# Patient Record
Sex: FEMALE | Race: WHITE | Employment: OTHER | URBAN - NONMETROPOLITAN AREA
[De-identification: names, ages, dates, MRNs, and addresses within clinical notes are randomized per-mention and may not be internally consistent; named-entity substitution may affect disease eponyms.]

---

## 2017-07-15 ENCOUNTER — HOSPITAL ENCOUNTER (OUTPATIENT)
Age: 67
Discharge: HOME | End: 2017-07-15
Payer: COMMERCIAL

## 2017-07-15 LAB
ALBUMIN SERPL-MCNC: 4.5 G/DL (ref 3.5–5.1)
ALP BLD-CCNC: 86 U/L (ref 38–126)
ALT SERPL-CCNC: 30 U/L (ref 11–66)
ANION GAP SERPL CALCULATED.3IONS-SCNC: 14 MEQ/L (ref 8–16)
AST SERPL-CCNC: 27 U/L (ref 5–40)
BACTERIA: ABNORMAL /HPF
BILIRUB SERPL-MCNC: 0.4 MG/DL (ref 0.3–1.2)
BILIRUBIN DIRECT: < 0.2 MG/DL (ref 0–0.3)
BILIRUBIN URINE: ABNORMAL
BLOOD, URINE: NEGATIVE
BUN BLDV-MCNC: 21 MG/DL (ref 7–22)
CALCIUM SERPL-MCNC: 10.3 MG/DL (ref 8.5–10.5)
CASTS 2: ABNORMAL /LPF
CASTS UA: ABNORMAL /LPF
CHARACTER, URINE: CLEAR
CHLORIDE BLD-SCNC: 104 MEQ/L (ref 98–111)
CHOLESTEROL, TOTAL: 231 MG/DL (ref 100–199)
CO2: 26 MEQ/L (ref 23–33)
COLOR: YELLOW
CREAT SERPL-MCNC: 1.1 MG/DL (ref 0.4–1.2)
CRYSTALS, UA: ABNORMAL
EPITHELIAL CELLS, UA: ABNORMAL /HPF
GFR SERPL CREATININE-BSD FRML MDRD: 49 ML/MIN/1.73M2
GLUCOSE BLD-MCNC: 102 MG/DL (ref 70–108)
GLUCOSE URINE: NEGATIVE MG/DL
HDLC SERPL-MCNC: 48 MG/DL
ICTOTEST: NEGATIVE
KETONES, URINE: NEGATIVE
LDL CHOLESTEROL CALCULATED: 138 MG/DL
LEUKOCYTE ESTERASE, URINE: ABNORMAL
MISCELLANEOUS 2: ABNORMAL
NITRITE, URINE: NEGATIVE
PH UA: 5.5
PHOSPHORUS: 3.8 MG/DL (ref 2.4–4.7)
POTASSIUM SERPL-SCNC: 4.2 MEQ/L (ref 3.5–5.2)
PROTEIN UA: NEGATIVE
RBC URINE: ABNORMAL /HPF
RENAL EPITHELIAL, UA: ABNORMAL
SODIUM BLD-SCNC: 144 MEQ/L (ref 135–145)
SPECIFIC GRAVITY, URINE: 1.02 (ref 1–1.03)
TOTAL PROTEIN: 7.8 G/DL (ref 6.1–8)
TRIGL SERPL-MCNC: 226 MG/DL (ref 0–199)
TSH SERPL DL<=0.05 MIU/L-ACNC: 3.39 UIU/ML (ref 0.4–4.2)
UROBILINOGEN, URINE: 0.2 EU/DL
WBC UA: ABNORMAL /HPF
YEAST: ABNORMAL

## 2017-07-15 PROCEDURE — 82306 VITAMIN D 25 HYDROXY: CPT

## 2017-07-15 PROCEDURE — 84443 ASSAY THYROID STIM HORMONE: CPT

## 2017-07-15 PROCEDURE — 36415 COLL VENOUS BLD VENIPUNCTURE: CPT

## 2017-07-15 PROCEDURE — 87086 URINE CULTURE/COLONY COUNT: CPT

## 2017-07-15 PROCEDURE — 84155 ASSAY OF PROTEIN SERUM: CPT

## 2017-07-15 PROCEDURE — 84450 TRANSFERASE (AST) (SGOT): CPT

## 2017-07-15 PROCEDURE — 84075 ASSAY ALKALINE PHOSPHATASE: CPT

## 2017-07-15 PROCEDURE — 82247 BILIRUBIN TOTAL: CPT

## 2017-07-15 PROCEDURE — 82248 BILIRUBIN DIRECT: CPT

## 2017-07-15 PROCEDURE — 80069 RENAL FUNCTION PANEL: CPT

## 2017-07-15 PROCEDURE — 84460 ALANINE AMINO (ALT) (SGPT): CPT

## 2017-07-15 PROCEDURE — 81001 URINALYSIS AUTO W/SCOPE: CPT

## 2017-07-15 PROCEDURE — 80061 LIPID PANEL: CPT

## 2017-07-15 PROCEDURE — 87077 CULTURE AEROBIC IDENTIFY: CPT

## 2017-07-16 LAB
ORGANISM: ABNORMAL
URINE CULTURE REFLEX: ABNORMAL

## 2017-07-17 LAB — VITAMIN D2 AND D3, TOTAL: NORMAL

## 2017-07-20 ENCOUNTER — HOSPITAL ENCOUNTER (OUTPATIENT)
Age: 67
Discharge: HOME | End: 2017-07-20
Payer: MEDICARE

## 2017-07-20 PROCEDURE — 87338 HPYLORI STOOL AG IA: CPT

## 2017-07-21 LAB — HELICOBACTER PYLORI ANTIGEN, STOOL: NORMAL

## 2017-07-26 ENCOUNTER — HOSPITAL ENCOUNTER (OUTPATIENT)
Dept: ULTRASOUND IMAGING | Age: 67
Discharge: HOME | End: 2017-07-26
Payer: MEDICARE

## 2017-07-26 DIAGNOSIS — N28.9 RENAL INSUFFICIENCY: ICD-10-CM

## 2017-07-26 PROCEDURE — 76775 US EXAM ABDO BACK WALL LIM: CPT

## 2017-09-28 ENCOUNTER — HOSPITAL ENCOUNTER (EMERGENCY)
Age: 67
Discharge: HOME | End: 2017-09-28
Payer: MEDICARE

## 2017-09-28 VITALS
TEMPERATURE: 98.2 F | WEIGHT: 187 LBS | RESPIRATION RATE: 16 BRPM | BODY MASS INDEX: 36.52 KG/M2 | SYSTOLIC BLOOD PRESSURE: 147 MMHG | DIASTOLIC BLOOD PRESSURE: 82 MMHG | HEART RATE: 81 BPM | OXYGEN SATURATION: 97 %

## 2017-09-28 DIAGNOSIS — N30.01 ACUTE CYSTITIS WITH HEMATURIA: Primary | ICD-10-CM

## 2017-09-28 LAB
BILIRUBIN URINE: NEGATIVE
BLOOD, URINE: ABNORMAL
CHARACTER, URINE: CLEAR
COLOR: YELLOW
GLUCOSE, URINE: NEGATIVE MG/DL
KETONES, URINE: NEGATIVE
LEUKOCYTES, UA: ABNORMAL
NITRATE, UA: POSITIVE
PH UA: 6.5 (ref 5–9)
PROTEIN UA: NEGATIVE MG/DL
REFLEX TO URINE C & S: ABNORMAL
SPECIFIC GRAVITY UA: 1.01 (ref 1–1.03)
UROBILINOGEN, URINE: 0.2 EU/DL (ref 0–1)

## 2017-09-28 PROCEDURE — 81003 URINALYSIS AUTO W/O SCOPE: CPT

## 2017-09-28 PROCEDURE — 99213 OFFICE O/P EST LOW 20 MIN: CPT | Performed by: NURSE PRACTITIONER

## 2017-09-28 PROCEDURE — 87086 URINE CULTURE/COLONY COUNT: CPT

## 2017-09-28 PROCEDURE — 99213 OFFICE O/P EST LOW 20 MIN: CPT

## 2017-09-28 RX ORDER — NITROFURANTOIN 25; 75 MG/1; MG/1
100 CAPSULE ORAL 2 TIMES DAILY
Qty: 14 CAPSULE | Refills: 0 | Status: SHIPPED | OUTPATIENT
Start: 2017-09-28 | End: 2017-09-28 | Stop reason: ALTCHOICE

## 2017-09-28 RX ORDER — ATORVASTATIN CALCIUM 20 MG/1
20 TABLET, FILM COATED ORAL DAILY
COMMUNITY

## 2017-09-28 RX ORDER — CIPROFLOXACIN 250 MG/1
500 TABLET, FILM COATED ORAL 2 TIMES DAILY
Qty: 12 TABLET | Refills: 0 | Status: SHIPPED | OUTPATIENT
Start: 2017-09-28 | End: 2017-10-01

## 2017-09-28 ASSESSMENT — PAIN DESCRIPTION - DESCRIPTORS: DESCRIPTORS: BURNING

## 2017-09-28 ASSESSMENT — ENCOUNTER SYMPTOMS
ABDOMINAL PAIN: 0
VOMITING: 0
DIARRHEA: 0
CONSTIPATION: 0
NAUSEA: 0
COLOR CHANGE: 0

## 2017-09-28 ASSESSMENT — PAIN DESCRIPTION - PAIN TYPE: TYPE: ACUTE PAIN

## 2017-09-28 ASSESSMENT — PAIN SCALES - GENERAL: PAINLEVEL_OUTOF10: 2

## 2017-09-29 LAB
ORGANISM: ABNORMAL
URINE CULTURE REFLEX: ABNORMAL

## 2017-12-20 ENCOUNTER — HOSPITAL ENCOUNTER (OUTPATIENT)
Age: 67
Discharge: HOME | End: 2017-12-20
Payer: MEDICARE

## 2017-12-20 LAB
ALBUMIN SERPL-MCNC: 4.3 G/DL (ref 3.5–5.1)
ALT SERPL-CCNC: 17 U/L (ref 11–66)
ANION GAP SERPL CALCULATED.3IONS-SCNC: 15 MEQ/L (ref 8–16)
AVERAGE GLUCOSE: 102 MG/DL (ref 70–126)
BASOPHILS # BLD: 0.6 %
BASOPHILS ABSOLUTE: 0 THOU/MM3 (ref 0–0.1)
BUN BLDV-MCNC: 20 MG/DL (ref 7–22)
CALCIUM SERPL-MCNC: 9.9 MG/DL (ref 8.5–10.5)
CHLORIDE BLD-SCNC: 106 MEQ/L (ref 98–111)
CHOLESTEROL, TOTAL: 135 MG/DL (ref 100–199)
CO2: 25 MEQ/L (ref 23–33)
CREAT SERPL-MCNC: 1 MG/DL (ref 0.4–1.2)
EOSINOPHIL # BLD: 3.6 %
EOSINOPHILS ABSOLUTE: 0.2 THOU/MM3 (ref 0–0.4)
GFR SERPL CREATININE-BSD FRML MDRD: 55 ML/MIN/1.73M2
GLUCOSE BLD-MCNC: 103 MG/DL (ref 70–108)
HBA1C MFR BLD: 5.4 % (ref 4.4–6.4)
HCT VFR BLD CALC: 36.1 % (ref 37–47)
HDLC SERPL-MCNC: 47 MG/DL
HEMOGLOBIN: 12.4 GM/DL (ref 12–16)
LDL CHOLESTEROL CALCULATED: 62 MG/DL
LYMPHOCYTES # BLD: 24.8 %
LYMPHOCYTES ABSOLUTE: 1.4 THOU/MM3 (ref 1–4.8)
MCH RBC QN AUTO: 29.6 PG (ref 27–31)
MCHC RBC AUTO-ENTMCNC: 34.3 GM/DL (ref 33–37)
MCV RBC AUTO: 86.2 FL (ref 81–99)
MONOCYTES # BLD: 8.8 %
MONOCYTES ABSOLUTE: 0.5 THOU/MM3 (ref 0.4–1.3)
NUCLEATED RED BLOOD CELLS: 0 /100 WBC
PDW BLD-RTO: 13 % (ref 11.5–14.5)
PHOSPHORUS: 3.8 MG/DL (ref 2.4–4.7)
PLATELET # BLD: 209 THOU/MM3 (ref 130–400)
PMV BLD AUTO: 9 MCM (ref 7.4–10.4)
POTASSIUM SERPL-SCNC: 4.1 MEQ/L (ref 3.5–5.2)
RBC # BLD: 4.18 MILL/MM3 (ref 4.2–5.4)
SEG NEUTROPHILS: 62.2 %
SEGMENTED NEUTROPHILS ABSOLUTE COUNT: 3.5 THOU/MM3 (ref 1.8–7.7)
SODIUM BLD-SCNC: 146 MEQ/L (ref 135–145)
TRIGL SERPL-MCNC: 131 MG/DL (ref 0–199)
WBC # BLD: 5.7 THOU/MM3 (ref 4.8–10.8)

## 2017-12-20 PROCEDURE — 36415 COLL VENOUS BLD VENIPUNCTURE: CPT

## 2017-12-20 PROCEDURE — 80061 LIPID PANEL: CPT

## 2017-12-20 PROCEDURE — 84460 ALANINE AMINO (ALT) (SGPT): CPT

## 2017-12-20 PROCEDURE — 83036 HEMOGLOBIN GLYCOSYLATED A1C: CPT

## 2017-12-20 PROCEDURE — 80069 RENAL FUNCTION PANEL: CPT

## 2017-12-20 PROCEDURE — 85025 COMPLETE CBC W/AUTO DIFF WBC: CPT

## 2018-01-10 ENCOUNTER — HOSPITAL ENCOUNTER (OUTPATIENT)
Age: 68
Discharge: HOME | End: 2018-01-10
Payer: MEDICARE

## 2018-01-10 ENCOUNTER — HOSPITAL ENCOUNTER (OUTPATIENT)
Dept: GENERAL RADIOLOGY | Age: 68
Discharge: HOME | End: 2018-01-10
Payer: MEDICARE

## 2018-01-10 DIAGNOSIS — M79.641 HAND PAIN, RIGHT: ICD-10-CM

## 2018-01-10 PROCEDURE — 73130 X-RAY EXAM OF HAND: CPT

## 2018-03-12 ENCOUNTER — HOSPITAL ENCOUNTER (OUTPATIENT)
Dept: WOMENS IMAGING | Age: 68
Discharge: HOME | End: 2018-03-12
Payer: MEDICARE

## 2018-03-12 DIAGNOSIS — Z12.31 VISIT FOR SCREENING MAMMOGRAM: ICD-10-CM

## 2018-03-12 PROCEDURE — 77063 BREAST TOMOSYNTHESIS BI: CPT

## 2018-03-14 ENCOUNTER — HOSPITAL ENCOUNTER (OUTPATIENT)
Dept: WOMENS IMAGING | Age: 68
Discharge: HOME | End: 2018-03-14
Payer: MEDICARE

## 2018-03-14 DIAGNOSIS — N63.0 BREAST NODULE: ICD-10-CM

## 2018-03-14 PROCEDURE — 76642 ULTRASOUND BREAST LIMITED: CPT

## 2018-03-14 PROCEDURE — G0279 TOMOSYNTHESIS, MAMMO: HCPCS

## 2018-07-09 ENCOUNTER — HOSPITAL ENCOUNTER (OUTPATIENT)
Age: 68
Discharge: HOME | End: 2018-07-09
Payer: MEDICARE

## 2018-07-09 LAB
ALBUMIN SERPL-MCNC: 4.4 G/DL (ref 3.5–5.1)
ALP BLD-CCNC: 78 U/L (ref 38–126)
ALT SERPL-CCNC: 21 U/L (ref 11–66)
ANION GAP SERPL CALCULATED.3IONS-SCNC: 11 MEQ/L (ref 8–16)
AST SERPL-CCNC: 24 U/L (ref 5–40)
BILIRUB SERPL-MCNC: 0.6 MG/DL (ref 0.3–1.2)
BUN BLDV-MCNC: 22 MG/DL (ref 7–22)
CALCIUM SERPL-MCNC: 9.9 MG/DL (ref 8.5–10.5)
CHLORIDE BLD-SCNC: 107 MEQ/L (ref 98–111)
CHOLESTEROL, TOTAL: 146 MG/DL (ref 100–199)
CO2: 26 MEQ/L (ref 23–33)
CREAT SERPL-MCNC: 1.2 MG/DL (ref 0.4–1.2)
GFR SERPL CREATININE-BSD FRML MDRD: 44 ML/MIN/1.73M2
GLUCOSE BLD-MCNC: 98 MG/DL (ref 70–108)
HDLC SERPL-MCNC: 55 MG/DL
LDL CHOLESTEROL CALCULATED: 76 MG/DL
POTASSIUM SERPL-SCNC: 4.4 MEQ/L (ref 3.5–5.2)
SODIUM BLD-SCNC: 144 MEQ/L (ref 135–145)
TOTAL CK: 84 U/L (ref 30–135)
TOTAL PROTEIN: 7.3 G/DL (ref 6.1–8)
TRIGL SERPL-MCNC: 74 MG/DL (ref 0–199)

## 2018-07-09 PROCEDURE — 82550 ASSAY OF CK (CPK): CPT

## 2018-07-09 PROCEDURE — 36415 COLL VENOUS BLD VENIPUNCTURE: CPT

## 2018-07-09 PROCEDURE — 80053 COMPREHEN METABOLIC PANEL: CPT

## 2018-07-09 PROCEDURE — 80061 LIPID PANEL: CPT

## 2018-09-12 ENCOUNTER — HOSPITAL ENCOUNTER (OUTPATIENT)
Dept: WOMENS IMAGING | Age: 68
Discharge: HOME | End: 2018-09-12
Payer: MEDICARE

## 2018-09-12 ENCOUNTER — APPOINTMENT (OUTPATIENT)
Dept: WOMENS IMAGING | Age: 68
End: 2018-09-12
Payer: MEDICARE

## 2018-09-12 DIAGNOSIS — Z09 FOLLOW UP: ICD-10-CM

## 2018-09-12 DIAGNOSIS — R92.2 BREAST DENSITY: ICD-10-CM

## 2018-09-12 DIAGNOSIS — N63.0 BREAST NODULE: ICD-10-CM

## 2018-09-12 PROCEDURE — 77065 DX MAMMO INCL CAD UNI: CPT

## 2019-01-16 ENCOUNTER — HOSPITAL ENCOUNTER (OUTPATIENT)
Age: 69
Discharge: HOME | End: 2019-01-16
Payer: MEDICARE

## 2019-01-16 LAB
ALBUMIN SERPL-MCNC: 4.5 G/DL (ref 3.5–5.1)
ALP BLD-CCNC: 83 U/L (ref 38–126)
ALT SERPL-CCNC: 21 U/L (ref 11–66)
ANION GAP SERPL CALCULATED.3IONS-SCNC: 14 MEQ/L (ref 8–16)
AST SERPL-CCNC: 25 U/L (ref 5–40)
BASOPHILS # BLD: 0.6 %
BASOPHILS ABSOLUTE: 0 THOU/MM3 (ref 0–0.1)
BILIRUB SERPL-MCNC: 0.7 MG/DL (ref 0.3–1.2)
BUN BLDV-MCNC: 17 MG/DL (ref 7–22)
CALCIUM SERPL-MCNC: 10 MG/DL (ref 8.5–10.5)
CHLORIDE BLD-SCNC: 105 MEQ/L (ref 98–111)
CHOLESTEROL, TOTAL: 143 MG/DL (ref 100–199)
CO2: 25 MEQ/L (ref 23–33)
CREAT SERPL-MCNC: 1.1 MG/DL (ref 0.4–1.2)
EOSINOPHIL # BLD: 2.1 %
EOSINOPHILS ABSOLUTE: 0.1 THOU/MM3 (ref 0–0.4)
ERYTHROCYTE [DISTWIDTH] IN BLOOD BY AUTOMATED COUNT: 12.8 % (ref 11.5–14.5)
ERYTHROCYTE [DISTWIDTH] IN BLOOD BY AUTOMATED COUNT: 41.4 FL (ref 35–45)
GFR SERPL CREATININE-BSD FRML MDRD: 49 ML/MIN/1.73M2
GLUCOSE BLD-MCNC: 105 MG/DL (ref 70–108)
HCT VFR BLD CALC: 40.3 % (ref 37–47)
HDLC SERPL-MCNC: 50 MG/DL
HEMOGLOBIN: 13.2 GM/DL (ref 12–16)
IMMATURE GRANS (ABS): 0.01 THOU/MM3 (ref 0–0.07)
IMMATURE GRANULOCYTES: 0.2 %
LDL CHOLESTEROL CALCULATED: 60 MG/DL
LYMPHOCYTES # BLD: 22.5 %
LYMPHOCYTES ABSOLUTE: 1.4 THOU/MM3 (ref 1–4.8)
MCH RBC QN AUTO: 29.1 PG (ref 26–33)
MCHC RBC AUTO-ENTMCNC: 32.8 GM/DL (ref 32.2–35.5)
MCV RBC AUTO: 89 FL (ref 81–99)
MONOCYTES # BLD: 10 %
MONOCYTES ABSOLUTE: 0.6 THOU/MM3 (ref 0.4–1.3)
NUCLEATED RED BLOOD CELLS: 0 /100 WBC
PHOSPHORUS: 3.4 MG/DL (ref 2.4–4.7)
PLATELET # BLD: 242 THOU/MM3 (ref 130–400)
PMV BLD AUTO: 10.6 FL (ref 9.4–12.4)
POTASSIUM SERPL-SCNC: 4 MEQ/L (ref 3.5–5.2)
RBC # BLD: 4.53 MILL/MM3 (ref 4.2–5.4)
SEG NEUTROPHILS: 64.6 %
SEGMENTED NEUTROPHILS ABSOLUTE COUNT: 4 THOU/MM3 (ref 1.8–7.7)
SODIUM BLD-SCNC: 144 MEQ/L (ref 135–145)
TOTAL CK: 88 U/L (ref 30–135)
TOTAL PROTEIN: 7.6 G/DL (ref 6.1–8)
TRIGL SERPL-MCNC: 163 MG/DL (ref 0–199)
WBC # BLD: 6.2 THOU/MM3 (ref 4.8–10.8)

## 2019-01-16 PROCEDURE — 80061 LIPID PANEL: CPT

## 2019-01-16 PROCEDURE — 36415 COLL VENOUS BLD VENIPUNCTURE: CPT

## 2019-01-16 PROCEDURE — 85025 COMPLETE CBC W/AUTO DIFF WBC: CPT

## 2019-01-16 PROCEDURE — 80053 COMPREHEN METABOLIC PANEL: CPT

## 2019-01-16 PROCEDURE — 84100 ASSAY OF PHOSPHORUS: CPT

## 2019-01-16 PROCEDURE — 82550 ASSAY OF CK (CPK): CPT

## 2019-03-29 ENCOUNTER — HOSPITAL ENCOUNTER (EMERGENCY)
Age: 69
Discharge: HOME | End: 2019-03-29
Payer: MEDICARE

## 2019-03-29 VITALS
RESPIRATION RATE: 16 BRPM | OXYGEN SATURATION: 97 % | SYSTOLIC BLOOD PRESSURE: 125 MMHG | HEART RATE: 78 BPM | DIASTOLIC BLOOD PRESSURE: 75 MMHG | WEIGHT: 184 LBS | BODY MASS INDEX: 35.94 KG/M2 | TEMPERATURE: 98.4 F

## 2019-03-29 DIAGNOSIS — S76.012A MUSCLE STRAIN OF GLUTEAL REGION, LEFT, INITIAL ENCOUNTER: Primary | ICD-10-CM

## 2019-03-29 DIAGNOSIS — M70.61 GREATER TROCHANTERIC BURSITIS OF RIGHT HIP: ICD-10-CM

## 2019-03-29 LAB
BILIRUBIN URINE: NEGATIVE
BLOOD, URINE: NEGATIVE
CHARACTER, URINE: CLEAR
COLOR: ABNORMAL
GLUCOSE, URINE: NEGATIVE MG/DL
KETONES, URINE: NEGATIVE
LEUKOCYTES, UA: NEGATIVE
NITRATE, UA: NEGATIVE
PH UA: 7 (ref 5–9)
PROTEIN UA: NEGATIVE MG/DL
REFLEX TO URINE C & S: ABNORMAL
SPECIFIC GRAVITY UA: 1.01 (ref 1–1.03)
UROBILINOGEN, URINE: 0.2 EU/DL (ref 0–1)

## 2019-03-29 PROCEDURE — 81003 URINALYSIS AUTO W/O SCOPE: CPT

## 2019-03-29 PROCEDURE — 99213 OFFICE O/P EST LOW 20 MIN: CPT

## 2019-03-29 PROCEDURE — 99213 OFFICE O/P EST LOW 20 MIN: CPT | Performed by: NURSE PRACTITIONER

## 2019-03-29 RX ORDER — RANITIDINE 300 MG/1
300 CAPSULE ORAL EVERY EVENING
COMMUNITY
End: 2020-01-02

## 2019-03-29 RX ORDER — ALENDRONATE SODIUM 70 MG/1
70 TABLET ORAL
COMMUNITY

## 2019-03-29 RX ORDER — METHYLPREDNISOLONE 4 MG/1
TABLET ORAL
Qty: 1 KIT | Refills: 0 | Status: SHIPPED | OUTPATIENT
Start: 2019-03-29 | End: 2019-04-04

## 2019-03-29 ASSESSMENT — PAIN DESCRIPTION - ORIENTATION: ORIENTATION: RIGHT

## 2019-03-29 ASSESSMENT — PAIN SCALES - GENERAL: PAINLEVEL_OUTOF10: 5

## 2019-03-29 ASSESSMENT — PAIN DESCRIPTION - PAIN TYPE: TYPE: ACUTE PAIN

## 2019-03-29 ASSESSMENT — PAIN - FUNCTIONAL ASSESSMENT: PAIN_FUNCTIONAL_ASSESSMENT: PREVENTS OR INTERFERES SOME ACTIVE ACTIVITIES AND ADLS

## 2019-03-29 ASSESSMENT — ENCOUNTER SYMPTOMS
ABDOMINAL PAIN: 1
DIARRHEA: 0
COUGH: 0
BACK PAIN: 0
VOMITING: 0
CONSTIPATION: 0
NAUSEA: 0

## 2019-03-29 ASSESSMENT — PAIN DESCRIPTION - FREQUENCY: FREQUENCY: CONTINUOUS

## 2019-03-29 ASSESSMENT — PAIN DESCRIPTION - DESCRIPTORS: DESCRIPTORS: THROBBING

## 2019-03-29 ASSESSMENT — PAIN DESCRIPTION - LOCATION: LOCATION: HIP

## 2019-03-29 NOTE — ED TRIAGE NOTES
Pt walked to room 8. Pt here with complaints of right hip pain and mid lower abd pain. Pt thinks maybe bladder. Pt states has no uti symptoms. Hip started 3 weeks ago. Pt has used ice, heat and pain patches, but not helping. Hurts more when standing up. Low abd pain started wed.

## 2019-03-29 NOTE — ED PROVIDER NOTES
[facility]  Urgent Care Encounter       CHIEF COMPLAINT       Chief Complaint   Patient presents with    Hip Pain     Pt having right hip pain x's 3 weeks. Aslo low mid abd pain started Wed. Nurses Notes reviewed and I agree except as noted in the HPI. HISTORY OF PRESENT ILLNESS   Tracey Gutierrez is a 77 y.o. female who presents to the urgent care center complaining of pain to the right lower back and hip that has been persistent over the past 3 weeks. The patient also states that she's had some intermittent lower abdominal pain over the past couple of days. The patient states that she does a and aerobic class III times a week area and she states they do walking in place working with bands, and a ball and she has been doing that routinely for the past several years. The patient states that she has had pain to the right upper posterior hip as well as on the right side of her right hip. Patient denies any injuries or falls. The patient states that it feels better when she is sitting. She has been putting ice and heat to the area and Tylenol for pain. She states that she cannot take NSAIDs. Patient denied any urinary symptoms, nausea, vomiting fever or chills related to the abdominal pain. The patient states that she did increase her fruits and vegetables and had 2 large bowel movements this morning. The history is provided by the patient. No  was used. Hip Pain   This is a new problem. Episode onset: 3 weeks. The problem occurs constantly. The problem has not changed since onset. Associated symptoms include abdominal pain. The symptoms are aggravated by walking, twisting and exertion. Nothing relieves the symptoms. She has tried rest, a cold compress and a warm compress for the symptoms. The treatment provided no relief.        REVIEW OF SYSTEMS     Review of Systems   Constitutional: Negative for activity change, appetite change, chills, diaphoresis, mg by mouth daily      vitamin D (CHOLECALCIFEROL) 1000 UNIT TABS tablet Take 1,000 Units by mouth daily             ALLERGIES     Patient is is allergic to other; penicillins; and sulfa antibiotics. Patients   There is no immunization history on file for this patient. FAMILY HISTORY     Patient's family history is not on file. SOCIAL HISTORY     Patient  reports that she has never smoked. She has never used smokeless tobacco. She reports that she does not drink alcohol or use drugs. PHYSICAL EXAM     ED TRIAGE VITALS  BP: 125/75, Temp: 98.4 °F (36.9 °C), Pulse: 78, Resp: 16, SpO2: 97 %,Estimated body mass index is 35.94 kg/m² as calculated from the following:    Height as of 4/28/17: 5' (1.524 m). Weight as of this encounter: 184 lb (83.5 kg). ,No LMP recorded. Patient is postmenopausal.    Physical Exam   Constitutional: She is oriented to person, place, and time. Vital signs are normal. She appears well-developed and well-nourished. She is cooperative. Non-toxic appearance. She does not have a sickly appearance. She does not appear ill. No distress. HENT:   Head: Normocephalic. Right Ear: External ear normal.   Left Ear: External ear normal.   Nose: Nose normal.   Neck: Normal range of motion and full passive range of motion without pain. Neck supple. Cardiovascular: Normal rate. Pulmonary/Chest: Effort normal.   Musculoskeletal: She exhibits tenderness. She exhibits no edema. Right hip: She exhibits tenderness and bony tenderness. She exhibits normal range of motion, normal strength, no swelling and no crepitus. Legs:  Neurological: She is alert and oriented to person, place, and time. Skin: Skin is warm and dry. Capillary refill takes less than 2 seconds. She is not diaphoretic. Patient denies recent trauma. The patient denies urinary incontinence, urinary retention, and numbness in the extremities. No spinal tenderness or deformity noted.  Bilateral lumbar muscles

## 2019-07-13 ENCOUNTER — HOSPITAL ENCOUNTER (OUTPATIENT)
Age: 69
Discharge: HOME | End: 2019-07-13
Payer: MEDICARE

## 2019-07-13 LAB
ALT SERPL-CCNC: 18 U/L (ref 11–66)
ANION GAP SERPL CALCULATED.3IONS-SCNC: 12 MEQ/L (ref 8–16)
AST SERPL-CCNC: 22 U/L (ref 5–40)
BUN BLDV-MCNC: 19 MG/DL (ref 7–22)
CALCIUM SERPL-MCNC: 10 MG/DL (ref 8.5–10.5)
CHLORIDE BLD-SCNC: 105 MEQ/L (ref 98–111)
CHOLESTEROL, TOTAL: 142 MG/DL (ref 100–199)
CO2: 27 MEQ/L (ref 23–33)
CREAT SERPL-MCNC: 1.1 MG/DL (ref 0.4–1.2)
GFR SERPL CREATININE-BSD FRML MDRD: 49 ML/MIN/1.73M2
GLUCOSE BLD-MCNC: 99 MG/DL (ref 70–108)
HDLC SERPL-MCNC: 56 MG/DL
LDL CHOLESTEROL CALCULATED: 44 MG/DL
POTASSIUM SERPL-SCNC: 4 MEQ/L (ref 3.5–5.2)
SODIUM BLD-SCNC: 144 MEQ/L (ref 135–145)
TRIGL SERPL-MCNC: 211 MG/DL (ref 0–199)

## 2019-07-13 PROCEDURE — 80048 BASIC METABOLIC PNL TOTAL CA: CPT

## 2019-07-13 PROCEDURE — 84460 ALANINE AMINO (ALT) (SGPT): CPT

## 2019-07-13 PROCEDURE — 36415 COLL VENOUS BLD VENIPUNCTURE: CPT

## 2019-07-13 PROCEDURE — 84450 TRANSFERASE (AST) (SGOT): CPT

## 2019-07-13 PROCEDURE — 80061 LIPID PANEL: CPT

## 2019-07-27 ENCOUNTER — HOSPITAL ENCOUNTER (OUTPATIENT)
Age: 69
Discharge: HOME | End: 2019-07-27
Payer: MEDICARE

## 2019-07-27 LAB
ANION GAP SERPL CALCULATED.3IONS-SCNC: 12 MEQ/L (ref 8–16)
BILIRUBIN URINE: NEGATIVE
BLOOD, URINE: NEGATIVE
BUN BLDV-MCNC: 21 MG/DL (ref 7–22)
CALCIUM SERPL-MCNC: 9.8 MG/DL (ref 8.5–10.5)
CHARACTER, URINE: CLEAR
CHLORIDE BLD-SCNC: 103 MEQ/L (ref 98–111)
CO2: 27 MEQ/L (ref 23–33)
COLOR: YELLOW
CREAT SERPL-MCNC: 1 MG/DL (ref 0.4–1.2)
GFR SERPL CREATININE-BSD FRML MDRD: 55 ML/MIN/1.73M2
GLUCOSE BLD-MCNC: 96 MG/DL (ref 70–108)
GLUCOSE URINE: NEGATIVE MG/DL
KETONES, URINE: NEGATIVE
LEUKOCYTE ESTERASE, URINE: NEGATIVE
NITRITE, URINE: NEGATIVE
PH UA: 7 (ref 5–9)
POTASSIUM SERPL-SCNC: 4.1 MEQ/L (ref 3.5–5.2)
PROTEIN UA: NEGATIVE
SODIUM BLD-SCNC: 142 MEQ/L (ref 135–145)
SPECIFIC GRAVITY, URINE: 1.01 (ref 1–1.03)
URIC ACID: 7.1 MG/DL (ref 2.4–5.7)
UROBILINOGEN, URINE: 0.2 EU/DL (ref 0–1)

## 2019-07-27 PROCEDURE — 84165 PROTEIN E-PHORESIS SERUM: CPT

## 2019-07-27 PROCEDURE — 84550 ASSAY OF BLOOD/URIC ACID: CPT

## 2019-07-27 PROCEDURE — 82784 ASSAY IGA/IGD/IGG/IGM EACH: CPT

## 2019-07-27 PROCEDURE — 80048 BASIC METABOLIC PNL TOTAL CA: CPT

## 2019-07-27 PROCEDURE — 86334 IMMUNOFIX E-PHORESIS SERUM: CPT

## 2019-07-27 PROCEDURE — 83883 ASSAY NEPHELOMETRY NOT SPEC: CPT

## 2019-07-27 PROCEDURE — 36415 COLL VENOUS BLD VENIPUNCTURE: CPT

## 2019-07-27 PROCEDURE — 86335 IMMUNFIX E-PHORSIS/URINE/CSF: CPT

## 2019-07-27 PROCEDURE — 84160 ASSAY OF PROTEIN ANY SOURCE: CPT

## 2019-07-27 PROCEDURE — 84156 ASSAY OF PROTEIN URINE: CPT

## 2019-07-27 PROCEDURE — 81003 URINALYSIS AUTO W/O SCOPE: CPT

## 2019-07-29 LAB — IMMUNOFIXATION WITH QUANT: NORMAL

## 2019-07-31 LAB — PROTEIN ELECTROPHORESIS, URINE: NORMAL

## 2019-09-12 ENCOUNTER — HOSPITAL ENCOUNTER (OUTPATIENT)
Dept: WOMENS IMAGING | Age: 69
Discharge: HOME | End: 2019-09-12
Payer: MEDICARE

## 2019-09-12 DIAGNOSIS — M81.0 AGE-RELATED OSTEOPOROSIS WITHOUT CURRENT PATHOLOGICAL FRACTURE: ICD-10-CM

## 2019-09-12 PROCEDURE — 77080 DXA BONE DENSITY AXIAL: CPT

## 2019-09-16 ENCOUNTER — HOSPITAL ENCOUNTER (OUTPATIENT)
Dept: WOMENS IMAGING | Age: 69
Discharge: HOME | End: 2019-09-16
Payer: MEDICARE

## 2019-09-16 DIAGNOSIS — Z12.39 BREAST SCREENING: ICD-10-CM

## 2019-09-16 PROCEDURE — 77063 BREAST TOMOSYNTHESIS BI: CPT

## 2020-01-02 ENCOUNTER — HOSPITAL ENCOUNTER (EMERGENCY)
Age: 70
Discharge: HOME | End: 2020-01-02
Payer: MEDICARE

## 2020-01-02 VITALS
DIASTOLIC BLOOD PRESSURE: 80 MMHG | OXYGEN SATURATION: 97 % | TEMPERATURE: 98.6 F | WEIGHT: 181 LBS | RESPIRATION RATE: 16 BRPM | HEART RATE: 72 BPM | SYSTOLIC BLOOD PRESSURE: 159 MMHG | HEIGHT: 60 IN | BODY MASS INDEX: 35.53 KG/M2

## 2020-01-02 PROCEDURE — 99213 OFFICE O/P EST LOW 20 MIN: CPT | Performed by: NURSE PRACTITIONER

## 2020-01-02 PROCEDURE — 99213 OFFICE O/P EST LOW 20 MIN: CPT

## 2020-01-02 RX ORDER — PREDNISONE 20 MG/1
40 TABLET ORAL DAILY
Qty: 14 TABLET | Refills: 0 | Status: SHIPPED | OUTPATIENT
Start: 2020-01-02 | End: 2020-01-09

## 2020-01-02 ASSESSMENT — ENCOUNTER SYMPTOMS
SHORTNESS OF BREATH: 0
NAUSEA: 0
SORE THROAT: 1
VOMITING: 0
COUGH: 1

## 2020-01-02 NOTE — ED TRIAGE NOTES
Pt to [facility] ambulatory with URI s/s, cough, and headache. This started 2 days ago.
Never Smoker    Smokeless tobacco: Never Used    Alcohol use 0.0 oz/week      Comment: q 2-3 weeks    Drug use: No    Sexual activity: Yes     Partners: Male     Birth control/ protection: Injection      Comment: depo     Other Topics Concern    Not on file     Social History Narrative    No narrative on file       TOBACCO:   reports that she has never smoked. She has never used smokeless tobacco.  ETOH:   reports that she drinks alcohol. Family History:   Family History   Problem Relation Age of Onset    Other Mother         dvt/factor v leiden    High Blood Pressure Mother    [facility] ADHD Mother     ADHD Brother     Cancer Paternal Grandmother         pancreatic    Other Maternal Grandmother         factor v leiden    Diabetes Maternal Grandfather     Prostate Cancer Maternal Grandfather     Other Other         brain aneurysm    Breast Cancer Maternal Cousin 27    Colon Cancer Neg Hx          Plan:  Pt interventions:  Provided education, Discussed self-care (sleep, nutrition, rewarding activities, social support, exercise), Supportive techniques, Emphasized self-care as important for managing overall health and Cognitive strategies to target chronic life issues including coping, past family and current family issues      Pt Behavioral Change Plan:  Patient returning for supportive counseling to address:  -depression, coping with health issues. There are no Patient Instructions on file for this visit.

## 2020-01-02 NOTE — ED PROVIDER NOTES
[facility]       CHIEF COMPLAINT       Chief Complaint   Patient presents with    Cough    URI    Headache       Nurses Notes reviewed and I agree except as noted in the HPI. HISTORY OF PRESENT ILLNESS   Theresa Henson is a 68 y.o. female who presents for evaluation of intermittent dizziness that occurs mainly with changing positions. Patient denies any chest pain or shortness of breath but states that over the past 3 to 4 weeks she has had consistent cough with intermittent sore throat as well. States that she has been using multiple over-the-counter medications without any relief. States that the dizziness began roughly 2 days ago and she notes it more anytime she turns her head. She denies numbness, tingling, headaches or visual disturbances. The history is provided by the patient. REVIEW OF SYSTEMS     Review of Systems   Constitutional: Negative for chills and fever. HENT: Positive for congestion and sore throat. Respiratory: Positive for cough. Negative for shortness of breath. Cardiovascular: Negative for chest pain. Gastrointestinal: Negative for nausea and vomiting. Musculoskeletal: Negative for arthralgias and myalgias. Skin: Negative for rash. Allergic/Immunologic: Negative for environmental allergies. Neurological: Positive for dizziness. Negative for headaches. PAST MEDICAL HISTORY         Diagnosis Date    Cancer [facility])     skin    GERD (gastroesophageal reflux disease)     Hyperlipidemia     Hypertension        SURGICALHISTORY     Patient  has a past surgical history that includes Cholecystectomy (06/11/2011); Colonoscopy (07/12/2012); skin biopsy; Dilation and curettage of uterus (2010 - 2013); and Mohs surgery (Right, 04/28/2017).     CURRENT MEDICATIONS       Previous Medications    ALENDRONATE (FOSAMAX) 70 MG TABLET    Take 70 mg by mouth every 7 days    ATORVASTATIN (LIPITOR) 20 MG TABLET    Take 20 mg by mouth daily    CALCIUM CARBONATE (OSCAL) 500 MG TABS TABLET    Take 500 mg by mouth daily    CLOBETASOL (TEMOVATE) 0.05 % CREAM    Apply topically 2 times daily Apply topically 2 times daily. hands    LOSARTAN-HYDROCHLOROTHIAZIDE (HYZAAR) 100-12.5 MG PER TABLET    Take 1 tablet by mouth daily    MULTIPLE VITAMINS-MINERALS (THERAPEUTIC MULTIVITAMIN-MINERALS) TABLET    Take 1 tablet by mouth daily    VITAMIN D (CHOLECALCIFEROL) 1000 UNIT TABS TABLET    Take 1,000 Units by mouth daily       ALLERGIES     Patient is is allergic to other; penicillins; and sulfa antibiotics. Patients   There is no immunization history on file for this patient. FAMILY HISTORY     Patient's family history is not on file. SOCIAL HISTORY     Patient  reports that she has never smoked. She has never used smokeless tobacco. She reports that she does not drink alcohol or use drugs. PHYSICAL EXAM     ED TRIAGE VITALS  BP: (!) 159/80, Temp: 98.6 °F (37 °C), Pulse: 72, Resp: 16, SpO2: 97 %,Estimated body mass index is 35.35 kg/m² as calculated from the following:    Height as of this encounter: 5' (1.524 m). Weight as of this encounter: 181 lb (82.1 kg). ,No LMP recorded. Patient is postmenopausal.    Physical Exam  Vitals signs and nursing note reviewed. Constitutional:       General: She is not in acute distress. Appearance: She is well-developed. She is not diaphoretic. HENT:      Right Ear: Tympanic membrane normal.      Left Ear: A middle ear effusion is present. Eyes:      Conjunctiva/sclera:      Right eye: Right conjunctiva is not injected. Left eye: Left conjunctiva is not injected. Pupils: Pupils are equal.   Neck:      Musculoskeletal: Normal range of motion. Cardiovascular:      Rate and Rhythm: Normal rate and regular rhythm. Heart sounds: No murmur. Pulmonary:      Effort: Pulmonary effort is normal. No respiratory distress. Breath sounds: Normal breath sounds.    Musculoskeletal: Right knee: She exhibits normal range of motion. Left knee: She exhibits normal range of motion. Skin:     General: Skin is warm. Findings: No rash. Neurological:      Mental Status: She is alert and oriented to person, place, and time. Psychiatric:         Behavior: Behavior normal.         DIAGNOSTIC RESULTS     Labs:No results found for this visit on 01/02/20. IMAGING:    No orders to display         EKG:  none    URGENT CARE COURSE:     Vitals:    01/02/20 1009   BP: (!) 159/80   Pulse: 72   Resp: 16   Temp: 98.6 °F (37 °C)   TempSrc: Temporal   SpO2: 97%   Weight: 181 lb (82.1 kg)   Height: 5' (1.524 m)       Medications - No data to display         PROCEDURES:  None    FINAL IMPRESSION      1. Upper respiratory tract infection, unspecified type    2. Benign paroxysmal positional vertigo, unspecified laterality          DISPOSITION/ PLAN     Exam is consistent with a viral upper respiratory infection, likely causing middle ear effusion and leading to the vertigo. Discussed with the patient the plan to treat with Coricidin with Mucinex as well as prednisone and she is advised to follow-up with her PCP if the symptoms do not improve or present to the ER if they significantly worsen. She is agreeable to plan as discussed. PATIENT REFERRED TO:  Jola Mcardle [address] [facility] [address] / [facility] [id number]      DISCHARGE MEDICATIONS:  New Prescriptions    DEXTROMETHORPHAN-GUAIFENESIN (CORICIDIN HBP CONGESTION/COUGH)  MG CAPS    Take 1 capsule by mouth 2 times daily as needed (cough/congestion)    PREDNISONE (DELTASONE) 20 MG TABLET    Take 2 tablets by mouth daily for 7 days       Discontinued Medications    MEGESTROL (MEGACE) 40 MG TABLET    Take 40 mg by mouth daily Takes on days 1-12. Take every other month.     PHENAZOPYRIDINE (PYRIDIUM) 200 MG TABLET    Take 1 tablet by mouth 3 times daily as needed for Pain    PHYTONADIONE (VITAMIN K1 IJ)    Inject as directed daily

## 2020-01-22 ENCOUNTER — HOSPITAL ENCOUNTER (OUTPATIENT)
Age: 70
Discharge: HOME | End: 2020-01-22
Payer: MEDICARE

## 2020-01-22 LAB
ALBUMIN SERPL-MCNC: 4.5 G/DL (ref 3.5–5.1)
ALP BLD-CCNC: 69 U/L (ref 38–126)
ALT SERPL-CCNC: 17 U/L (ref 11–66)
ANION GAP SERPL CALCULATED.3IONS-SCNC: 12 MEQ/L (ref 8–16)
AST SERPL-CCNC: 21 U/L (ref 5–40)
BILIRUB SERPL-MCNC: 0.6 MG/DL (ref 0.3–1.2)
BUN BLDV-MCNC: 18 MG/DL (ref 7–22)
CALCIUM SERPL-MCNC: 10 MG/DL (ref 8.5–10.5)
CHLORIDE BLD-SCNC: 104 MEQ/L (ref 98–111)
CHOLESTEROL, TOTAL: 169 MG/DL (ref 100–199)
CO2: 26 MEQ/L (ref 23–33)
CREAT SERPL-MCNC: 1 MG/DL (ref 0.4–1.2)
GFR SERPL CREATININE-BSD FRML MDRD: 55 ML/MIN/1.73M2
GLUCOSE BLD-MCNC: 103 MG/DL (ref 70–108)
HDLC SERPL-MCNC: 56 MG/DL
LDL CHOLESTEROL CALCULATED: 79 MG/DL
POTASSIUM SERPL-SCNC: 4 MEQ/L (ref 3.5–5.2)
SODIUM BLD-SCNC: 142 MEQ/L (ref 135–145)
TOTAL PROTEIN: 7.3 G/DL (ref 6.1–8)
TRIGL SERPL-MCNC: 168 MG/DL (ref 0–199)

## 2020-01-22 PROCEDURE — 80053 COMPREHEN METABOLIC PANEL: CPT

## 2020-01-22 PROCEDURE — 80061 LIPID PANEL: CPT

## 2020-01-22 PROCEDURE — 82306 VITAMIN D 25 HYDROXY: CPT

## 2020-01-22 PROCEDURE — 36415 COLL VENOUS BLD VENIPUNCTURE: CPT

## 2020-01-25 LAB — VITAMIN D2 AND D3, TOTAL: NORMAL

## 2020-02-03 ENCOUNTER — HOSPITAL ENCOUNTER (OUTPATIENT)
Age: 70
Discharge: HOME | End: 2020-02-03
Payer: MEDICARE

## 2020-02-03 LAB
ALBUMIN SERPL-MCNC: 4.4 G/DL (ref 3.5–5.1)
ALP BLD-CCNC: 76 U/L (ref 38–126)
ALT SERPL-CCNC: 15 U/L (ref 11–66)
ANION GAP SERPL CALCULATED.3IONS-SCNC: 13 MEQ/L (ref 8–16)
AST SERPL-CCNC: 20 U/L (ref 5–40)
BILIRUB SERPL-MCNC: 0.4 MG/DL (ref 0.3–1.2)
BILIRUBIN URINE: NEGATIVE
BLOOD, URINE: NEGATIVE
BUN BLDV-MCNC: 22 MG/DL (ref 7–22)
CALCIUM SERPL-MCNC: 10.1 MG/DL (ref 8.5–10.5)
CHARACTER, URINE: CLEAR
CHLORIDE BLD-SCNC: 102 MEQ/L (ref 98–111)
CO2: 27 MEQ/L (ref 23–33)
COLOR: YELLOW
CREAT SERPL-MCNC: 1 MG/DL (ref 0.4–1.2)
ERYTHROCYTE [DISTWIDTH] IN BLOOD BY AUTOMATED COUNT: 12.7 % (ref 11.5–14.5)
ERYTHROCYTE [DISTWIDTH] IN BLOOD BY AUTOMATED COUNT: 41.4 FL (ref 35–45)
GFR SERPL CREATININE-BSD FRML MDRD: 55 ML/MIN/1.73M2
GLUCOSE BLD-MCNC: 87 MG/DL (ref 70–108)
GLUCOSE, URINE: NEGATIVE MG/DL
HCT VFR BLD CALC: 38.1 % (ref 37–47)
HEMOGLOBIN: 12.4 GM/DL (ref 12–16)
KETONES, URINE: NEGATIVE
LEUKOCYTE EST, POC: NEGATIVE
MCH RBC QN AUTO: 29.3 PG (ref 26–33)
MCHC RBC AUTO-ENTMCNC: 32.5 GM/DL (ref 32.2–35.5)
MCV RBC AUTO: 90.1 FL (ref 81–99)
NITRITE, URINE: NEGATIVE
PH UA: 6.5 (ref 5–9)
PLATELET # BLD: 237 THOU/MM3 (ref 130–400)
PMV BLD AUTO: 10.5 FL (ref 9.4–12.4)
POTASSIUM SERPL-SCNC: 4.3 MEQ/L (ref 3.5–5.2)
PROTEIN UA: NEGATIVE MG/DL
RBC # BLD: 4.23 MILL/MM3 (ref 4.2–5.4)
SODIUM BLD-SCNC: 142 MEQ/L (ref 135–145)
SPECIFIC GRAVITY UA: 1.01 (ref 1–1.03)
TOTAL PROTEIN: 7.4 G/DL (ref 6.1–8)
URIC ACID: 6.7 MG/DL (ref 2.4–5.7)
UROBILINOGEN, URINE: 0.2 EU/DL (ref 0–1)
WBC # BLD: 6 THOU/MM3 (ref 4.8–10.8)

## 2020-02-03 PROCEDURE — 36415 COLL VENOUS BLD VENIPUNCTURE: CPT

## 2020-02-03 PROCEDURE — 80053 COMPREHEN METABOLIC PANEL: CPT

## 2020-02-03 PROCEDURE — 85027 COMPLETE CBC AUTOMATED: CPT

## 2020-02-03 PROCEDURE — 84550 ASSAY OF BLOOD/URIC ACID: CPT

## 2020-02-03 PROCEDURE — 81003 URINALYSIS AUTO W/O SCOPE: CPT

## 2020-07-23 ENCOUNTER — HOSPITAL ENCOUNTER (OUTPATIENT)
Age: 70
Discharge: HOME | End: 2020-07-23
Payer: MEDICARE

## 2020-07-23 LAB
ALBUMIN SERPL-MCNC: 4.3 G/DL (ref 3.5–5.1)
ALP BLD-CCNC: 72 U/L (ref 38–126)
ALT SERPL-CCNC: 15 U/L (ref 11–66)
ANION GAP SERPL CALCULATED.3IONS-SCNC: 11 MEQ/L (ref 8–16)
AST SERPL-CCNC: 20 U/L (ref 5–40)
BILIRUB SERPL-MCNC: 0.6 MG/DL (ref 0.3–1.2)
BUN BLDV-MCNC: 20 MG/DL (ref 7–22)
CALCIUM SERPL-MCNC: 9.8 MG/DL (ref 8.5–10.5)
CHLORIDE BLD-SCNC: 106 MEQ/L (ref 98–111)
CHOLESTEROL, TOTAL: 147 MG/DL (ref 100–199)
CO2: 26 MEQ/L (ref 23–33)
CREAT SERPL-MCNC: 1 MG/DL (ref 0.4–1.2)
GFR SERPL CREATININE-BSD FRML MDRD: 55 ML/MIN/1.73M2
GLUCOSE BLD-MCNC: 90 MG/DL (ref 70–108)
HDLC SERPL-MCNC: 53 MG/DL
LDL CHOLESTEROL CALCULATED: 58 MG/DL
POTASSIUM SERPL-SCNC: 4.3 MEQ/L (ref 3.5–5.2)
SODIUM BLD-SCNC: 143 MEQ/L (ref 135–145)
TOTAL PROTEIN: 7 G/DL (ref 6.1–8)
TRIGL SERPL-MCNC: 178 MG/DL (ref 0–199)

## 2020-07-23 PROCEDURE — 36415 COLL VENOUS BLD VENIPUNCTURE: CPT

## 2020-07-23 PROCEDURE — 80053 COMPREHEN METABOLIC PANEL: CPT

## 2020-07-23 PROCEDURE — 80061 LIPID PANEL: CPT

## 2020-07-23 PROCEDURE — 82306 VITAMIN D 25 HYDROXY: CPT

## 2020-07-29 LAB — VITAMIN D2 AND D3, TOTAL: NORMAL

## 2020-08-02 ENCOUNTER — HOSPITAL ENCOUNTER (EMERGENCY)
Age: 70
Discharge: HOME | End: 2020-08-02
Attending: FAMILY MEDICINE
Payer: MEDICARE

## 2020-08-02 ENCOUNTER — APPOINTMENT (OUTPATIENT)
Dept: GENERAL RADIOLOGY | Age: 70
End: 2020-08-02
Payer: MEDICARE

## 2020-08-02 VITALS
SYSTOLIC BLOOD PRESSURE: 158 MMHG | HEART RATE: 91 BPM | HEIGHT: 60 IN | BODY MASS INDEX: 35.34 KG/M2 | DIASTOLIC BLOOD PRESSURE: 100 MMHG | WEIGHT: 180 LBS | RESPIRATION RATE: 16 BRPM | OXYGEN SATURATION: 100 % | TEMPERATURE: 98 F

## 2020-08-02 PROCEDURE — 99283 EMERGENCY DEPT VISIT LOW MDM: CPT

## 2020-08-02 PROCEDURE — 73564 X-RAY EXAM KNEE 4 OR MORE: CPT

## 2020-08-02 ASSESSMENT — ENCOUNTER SYMPTOMS
RHINORRHEA: 0
EYE DISCHARGE: 0
VOMITING: 0
SHORTNESS OF BREATH: 0
COLOR CHANGE: 0
SINUS PAIN: 0
NAUSEA: 0
CONSTIPATION: 0
EYE ITCHING: 0
CHEST TIGHTNESS: 0
ABDOMINAL DISTENTION: 0
SINUS PRESSURE: 0
DIARRHEA: 0
ABDOMINAL PAIN: 0

## 2020-08-02 ASSESSMENT — PAIN DESCRIPTION - PAIN TYPE: TYPE: ACUTE PAIN

## 2020-08-02 ASSESSMENT — PAIN SCALES - GENERAL: PAINLEVEL_OUTOF10: 10

## 2020-08-02 ASSESSMENT — PAIN DESCRIPTION - ONSET: ONSET: ON-GOING

## 2020-08-02 ASSESSMENT — PAIN DESCRIPTION - PROGRESSION: CLINICAL_PROGRESSION: GRADUALLY WORSENING

## 2020-08-02 ASSESSMENT — PAIN DESCRIPTION - FREQUENCY: FREQUENCY: CONTINUOUS

## 2020-08-02 ASSESSMENT — PAIN DESCRIPTION - DESCRIPTORS: DESCRIPTORS: THROBBING

## 2020-08-02 ASSESSMENT — PAIN DESCRIPTION - LOCATION: LOCATION: KNEE

## 2020-08-02 ASSESSMENT — PAIN DESCRIPTION - ORIENTATION: ORIENTATION: RIGHT

## 2020-08-02 NOTE — ED PROVIDER NOTES
Roc Kirk MD, personally performed and participated in key or critical portions of the evaluation and management including personally performing the exam and medical decision making. I verify the accuracy of the documentation by the resident. Osteoarthritis    Knee xray. [facility] follow-up (established patient). I was present for the key or all critical portions of the procedure(s) performed by the resident physician.        Joannie Barthel, MD  08/02/20 9325

## 2020-08-02 NOTE — ED PROVIDER NOTES
abdominal distention, abdominal pain, constipation, diarrhea, nausea and vomiting. Genitourinary: Negative for dysuria, frequency and urgency. Musculoskeletal: Positive for arthralgias, gait problem and joint swelling. Skin: Negative for color change. Neurological: Negative for dizziness, weakness and light-headedness. PAST MEDICAL AND SURGICAL HISTORY     Past Medical History:   Diagnosis Date    Cancer ([address]     skin    GERD (gastroesophageal reflux disease)     Hyperlipidemia     Hypertension      Past Surgical History:   Procedure Laterality Date    CHOLECYSTECTOMY  06/11/2011    COLONOSCOPY  07/12/2012    last one    DILATION AND CURETTAGE OF UTERUS  2010 - 2013    x3    MOHS SURGERY Right 04/28/2017    defect repair BCC right upper lip    SKIN BIOPSY           MEDICATIONS   No current facility-administered medications for this encounter. Current Outpatient Medications:     Dextromethorphan-guaiFENesin (CORICIDIN HBP CONGESTION/COUGH)  MG CAPS, Take 1 capsule by mouth 2 times daily as needed (cough/congestion), Disp: 60 capsule, Rfl: 0    alendronate (FOSAMAX) 70 MG tablet, Take 70 mg by mouth every 7 days, Disp: , Rfl:     atorvastatin (LIPITOR) 20 MG tablet, Take 20 mg by mouth daily, Disp: , Rfl:     clobetasol (TEMOVATE) 0.05 % cream, Apply topically 2 times daily Apply topically 2 times daily.    hands, Disp: , Rfl:     losartan-hydrochlorothiazide (HYZAAR) 100-12.5 MG per tablet, Take 1 tablet by mouth daily, Disp: , Rfl:     Multiple Vitamins-Minerals (THERAPEUTIC MULTIVITAMIN-MINERALS) tablet, Take 1 tablet by mouth daily, Disp: , Rfl:     calcium carbonate (OSCAL) 500 MG TABS tablet, Take 500 mg by mouth daily, Disp: , Rfl:     vitamin D (CHOLECALCIFEROL) 1000 UNIT TABS tablet, Take 1,000 Units by mouth daily, Disp: , Rfl:       SOCIAL HISTORY     Social History     Social History Narrative    Not on file     Social History     Tobacco Use    Smoking status: Never Smoker    Smokeless tobacco: Never Used   Substance Use Topics    Alcohol use: No    Drug use: No         ALLERGIES     Allergies   Allergen Reactions    Other      Bandaids - rash    Penicillins Hives and Itching    Sulfa Antibiotics Hives and Itching         FAMILY HISTORY   History reviewed. No pertinent family history. PREVIOUS RECORDS   Previous records reviewed      PHYSICAL EXAM     ED Triage Vitals [08/02/20 0927]   BP Temp Temp Source Pulse Resp SpO2 Height Weight   (!) 158/100 98 °F (36.7 °C) Oral 91 16 100 % 5' (1.524 m) 180 lb (81.6 kg)     Initial vital signs and nursing assessment reviewed and abnormal from Hypertension. Pulsoximetry is normal per my interpretation. Additional Vital Signs:  Vitals:    08/02/20 0927   BP: (!) 158/100   Pulse: 91   Resp: 16   Temp: 98 °F (36.7 °C)   SpO2: 100%       Physical Exam  Constitutional:       General: She is not in acute distress. Appearance: She is obese. She is not ill-appearing. HENT:      Head: Normocephalic and atraumatic. Nose: Nose normal. No congestion or rhinorrhea. Neck:      Musculoskeletal: Normal range of motion and neck supple. Cardiovascular:      Rate and Rhythm: Normal rate and regular rhythm. Pulses: Normal pulses. Heart sounds: Normal heart sounds. Pulmonary:      Effort: Pulmonary effort is normal. No respiratory distress. Breath sounds: Normal breath sounds. Musculoskeletal:         General: Swelling and tenderness present. Comments: Patient is able to bear weight and ambulate without assistance in the emergency department, however with some difficulty due to pain in the right knee. Right knee is mildly swollen compared to the left knee. Pain is reproducible with valgus stress and Torsten's test of the lateral meniscus.   Negative anterior and posterior drawer tests and negative Lachman's test.    She is able to move her knee passively through a full range of motion, however active range of motion is limited due to pain. She has intact pulse motor and sensation distal to the knees bilaterally. Intact DP pulses. 2 out of 4 patellar and Achilles reflexes present bilaterally. Patella is able to freely move on the right knee with no pain or crepitus. Patellar grind test is negative. Skin:     General: Skin is warm and dry. Neurological:      Mental Status: She is alert and oriented to person, place, and time. Mental status is at baseline. MEDICAL DECISION MAKING   Initial Assessment: 77-year-old female with sudden onset of right knee pain while walking. Possible meniscus, ligamentous, or soft tissue injury. Plan: X-rays of right knee, knee immobilizer, follow-up with [facility]. ED RESULTS   Laboratory results:  Labs Reviewed - No data to display    Radiologic studies results:  XR KNEE RIGHT (MIN 4 VIEWS)   Final Result   1. Questionable mild soft tissue swelling anteriorly. 2. Otherwise normal knee. **This report has been created using voice recognition software. It may contain minor errors which are inherent in voice recognition technology. **            Final report electronically signed by Dr. Naveed Nieves on 8/2/2020 11:28 AM          ED Medications administered this visit: Medications - No data to display      ED COURSE      Strict return precautions and follow up instructions were discussed with the patient prior to discharge, with which the patient agrees. MEDICATION CHANGES     Discharge Medication List as of 8/2/2020 11:57 AM            FINAL DISPOSITION   This is a 77-year-old female with past medical history of hypertension, hyperlipidemia presenting to the emergency department for evaluation of right knee pain. X-rays of the right knee were performed and are negative for acute fracture and dislocation. However the patient has joint tenderness and pain with valgus stress and with Torsten's test of the lateral meniscus.   Is possible

## 2020-08-02 NOTE — ED TRIAGE NOTES
Patient arrived to room E with c/o right knee pain. Patient stated she went to the bathroom this morning and when she got back in bed heard a loud crack sound. Patient stated on Thursday she was seen by doctor and had xray done on the knee and didn't see any new problems with it.

## 2020-08-12 ENCOUNTER — HOSPITAL ENCOUNTER (OUTPATIENT)
Age: 70
Discharge: HOME | End: 2020-08-12
Payer: MEDICARE

## 2020-08-12 LAB
ALBUMIN SERPL-MCNC: 4.3 G/DL (ref 3.5–5.1)
ALP BLD-CCNC: 69 U/L (ref 38–126)
ALT SERPL-CCNC: 12 U/L (ref 11–66)
ANION GAP SERPL CALCULATED.3IONS-SCNC: 13 MEQ/L (ref 8–16)
AST SERPL-CCNC: 19 U/L (ref 5–40)
BACTERIA: ABNORMAL
BILIRUB SERPL-MCNC: 0.4 MG/DL (ref 0.3–1.2)
BILIRUBIN URINE: NEGATIVE
BLOOD, URINE: NEGATIVE
BUN BLDV-MCNC: 23 MG/DL (ref 7–22)
CALCIUM SERPL-MCNC: 9.4 MG/DL (ref 8.5–10.5)
CASTS: ABNORMAL /LPF
CASTS: ABNORMAL /LPF
CHARACTER, URINE: CLEAR
CHLORIDE BLD-SCNC: 107 MEQ/L (ref 98–111)
CO2: 22 MEQ/L (ref 23–33)
COLOR: YELLOW
CREAT SERPL-MCNC: 0.9 MG/DL (ref 0.4–1.2)
CRYSTALS: ABNORMAL
EPITHELIAL CELLS, UA: ABNORMAL /HPF
ERYTHROCYTE [DISTWIDTH] IN BLOOD BY AUTOMATED COUNT: 12.5 % (ref 11.5–14.5)
ERYTHROCYTE [DISTWIDTH] IN BLOOD BY AUTOMATED COUNT: 42.3 FL (ref 35–45)
GFR SERPL CREATININE-BSD FRML MDRD: 62 ML/MIN/1.73M2
GLUCOSE BLD-MCNC: 86 MG/DL (ref 70–108)
GLUCOSE, URINE: NEGATIVE MG/DL
HCT VFR BLD CALC: 37.9 % (ref 37–47)
HEMOGLOBIN: 12.3 GM/DL (ref 12–16)
KETONES, URINE: NEGATIVE
LEUKOCYTE EST, POC: ABNORMAL
MCH RBC QN AUTO: 30 PG (ref 26–33)
MCHC RBC AUTO-ENTMCNC: 32.5 GM/DL (ref 32.2–35.5)
MCV RBC AUTO: 92.4 FL (ref 81–99)
MISCELLANEOUS LAB TEST RESULT: ABNORMAL
NITRITE, URINE: NEGATIVE
PH UA: 6 (ref 5–9)
PLATELET # BLD: 217 THOU/MM3 (ref 130–400)
PMV BLD AUTO: 10.7 FL (ref 9.4–12.4)
POTASSIUM SERPL-SCNC: 3.9 MEQ/L (ref 3.5–5.2)
PROTEIN UA: NEGATIVE MG/DL
RBC # BLD: 4.1 MILL/MM3 (ref 4.2–5.4)
RBC URINE: ABNORMAL /HPF
RENAL EPITHELIAL, UA: ABNORMAL
SODIUM BLD-SCNC: 142 MEQ/L (ref 135–145)
SPECIFIC GRAVITY UA: 1.02 (ref 1–1.03)
TOTAL PROTEIN: 6.7 G/DL (ref 6.1–8)
URIC ACID: 7 MG/DL (ref 2.4–5.7)
UROBILINOGEN, URINE: 1 EU/DL (ref 0–1)
WBC # BLD: 6 THOU/MM3 (ref 4.8–10.8)
WBC UA: ABNORMAL /HPF
YEAST: ABNORMAL

## 2020-08-12 PROCEDURE — 81001 URINALYSIS AUTO W/SCOPE: CPT

## 2020-08-12 PROCEDURE — 36415 COLL VENOUS BLD VENIPUNCTURE: CPT

## 2020-08-12 PROCEDURE — 85027 COMPLETE CBC AUTOMATED: CPT

## 2020-08-12 PROCEDURE — 84550 ASSAY OF BLOOD/URIC ACID: CPT

## 2020-08-12 PROCEDURE — 80053 COMPREHEN METABOLIC PANEL: CPT

## 2020-10-12 ENCOUNTER — HOSPITAL ENCOUNTER (OUTPATIENT)
Dept: WOMENS IMAGING | Age: 70
Discharge: HOME | End: 2020-10-12
Payer: MEDICARE

## 2020-10-12 PROCEDURE — 77063 BREAST TOMOSYNTHESIS BI: CPT

## 2021-03-02 ENCOUNTER — HOSPITAL ENCOUNTER (OUTPATIENT)
Dept: ULTRASOUND IMAGING | Age: 71
Discharge: HOME | End: 2021-03-02
Payer: MEDICARE

## 2021-03-02 DIAGNOSIS — R10.11 ABDOMINAL PAIN, RUQ: ICD-10-CM

## 2021-03-02 DIAGNOSIS — R10.13 ABDOMINAL PAIN, EPIGASTRIC: ICD-10-CM

## 2021-03-02 PROCEDURE — 76705 ECHO EXAM OF ABDOMEN: CPT

## 2021-03-27 ENCOUNTER — NURSE ONLY (OUTPATIENT)
Dept: LAB | Age: 71
End: 2021-03-27

## 2021-03-27 LAB
ALBUMIN SERPL-MCNC: 4.3 G/DL (ref 3.5–5.1)
ALP BLD-CCNC: 77 U/L (ref 38–126)
ALT SERPL-CCNC: 12 U/L (ref 11–66)
ANION GAP SERPL CALCULATED.3IONS-SCNC: 10 MEQ/L (ref 8–16)
AST SERPL-CCNC: 20 U/L (ref 5–40)
BACTERIA: ABNORMAL /HPF
BASOPHILS # BLD: 0.8 %
BASOPHILS ABSOLUTE: 0.1 THOU/MM3 (ref 0–0.1)
BILIRUB SERPL-MCNC: 0.5 MG/DL (ref 0.3–1.2)
BILIRUBIN URINE: NEGATIVE
BLOOD, URINE: NEGATIVE
BUN BLDV-MCNC: 19 MG/DL (ref 7–22)
CALCIUM SERPL-MCNC: 10 MG/DL (ref 8.5–10.5)
CASTS 2: ABNORMAL /LPF
CASTS UA: ABNORMAL /LPF
CHARACTER, URINE: CLEAR
CHLORIDE BLD-SCNC: 105 MEQ/L (ref 98–111)
CO2: 29 MEQ/L (ref 23–33)
COLOR: YELLOW
CREAT SERPL-MCNC: 1.1 MG/DL (ref 0.4–1.2)
CRYSTALS, UA: ABNORMAL
EOSINOPHIL # BLD: 2.8 %
EOSINOPHILS ABSOLUTE: 0.2 THOU/MM3 (ref 0–0.4)
EPITHELIAL CELLS, UA: ABNORMAL /HPF
ERYTHROCYTE [DISTWIDTH] IN BLOOD BY AUTOMATED COUNT: 12.4 % (ref 11.5–14.5)
ERYTHROCYTE [DISTWIDTH] IN BLOOD BY AUTOMATED COUNT: 41.5 FL (ref 35–45)
GFR SERPL CREATININE-BSD FRML MDRD: 49 ML/MIN/1.73M2
GLUCOSE BLD-MCNC: 85 MG/DL (ref 70–108)
GLUCOSE URINE: NEGATIVE MG/DL
HCT VFR BLD CALC: 40.1 % (ref 37–47)
HEMOGLOBIN: 12.8 GM/DL (ref 12–16)
IMMATURE GRANS (ABS): 0.01 THOU/MM3 (ref 0–0.07)
IMMATURE GRANULOCYTES: 0.2 %
KETONES, URINE: ABNORMAL
LEUKOCYTE ESTERASE, URINE: ABNORMAL
LYMPHOCYTES # BLD: 28.9 %
LYMPHOCYTES ABSOLUTE: 1.8 THOU/MM3 (ref 1–4.8)
MCH RBC QN AUTO: 29 PG (ref 26–33)
MCHC RBC AUTO-ENTMCNC: 31.9 GM/DL (ref 32.2–35.5)
MCV RBC AUTO: 90.7 FL (ref 81–99)
MISCELLANEOUS 2: ABNORMAL
MONOCYTES # BLD: 10 %
MONOCYTES ABSOLUTE: 0.6 THOU/MM3 (ref 0.4–1.3)
NITRITE, URINE: NEGATIVE
NUCLEATED RED BLOOD CELLS: 0 /100 WBC
PH UA: 6.5 (ref 5–9)
PLATELET # BLD: 221 THOU/MM3 (ref 130–400)
PMV BLD AUTO: 10.6 FL (ref 9.4–12.4)
POTASSIUM SERPL-SCNC: 3.9 MEQ/L (ref 3.5–5.2)
PROTEIN UA: NEGATIVE
RBC # BLD: 4.42 MILL/MM3 (ref 4.2–5.4)
RBC URINE: ABNORMAL /HPF
RENAL EPITHELIAL, UA: ABNORMAL
SEG NEUTROPHILS: 57.3 %
SEGMENTED NEUTROPHILS ABSOLUTE COUNT: 3.7 THOU/MM3 (ref 1.8–7.7)
SODIUM BLD-SCNC: 144 MEQ/L (ref 135–145)
SPECIFIC GRAVITY, URINE: 1.02 (ref 1–1.03)
TOTAL PROTEIN: 7.3 G/DL (ref 6.1–8)
URIC ACID: 6.7 MG/DL (ref 2.4–5.7)
UROBILINOGEN, URINE: 0.2 EU/DL (ref 0–1)
WBC # BLD: 6.4 THOU/MM3 (ref 4.8–10.8)
WBC UA: ABNORMAL /HPF
YEAST: ABNORMAL

## 2021-10-14 ENCOUNTER — HOSPITAL ENCOUNTER (OUTPATIENT)
Dept: WOMENS IMAGING | Age: 71
Discharge: HOME | End: 2021-10-14
Payer: MEDICARE

## 2021-10-14 DIAGNOSIS — Z12.31 VISIT FOR SCREENING MAMMOGRAM: ICD-10-CM

## 2021-10-14 PROCEDURE — 77063 BREAST TOMOSYNTHESIS BI: CPT

## 2022-02-23 ENCOUNTER — HOSPITAL ENCOUNTER (OUTPATIENT)
Age: 72
Discharge: HOME | End: 2022-02-23
Payer: MEDICARE

## 2022-02-23 ENCOUNTER — HOSPITAL ENCOUNTER (OUTPATIENT)
Dept: GENERAL RADIOLOGY | Age: 72
Discharge: HOME | End: 2022-02-23
Payer: MEDICARE

## 2022-02-23 DIAGNOSIS — M79.671 RIGHT FOOT PAIN: ICD-10-CM

## 2022-02-23 PROCEDURE — 73630 X-RAY EXAM OF FOOT: CPT

## 2022-10-25 ENCOUNTER — HOSPITAL ENCOUNTER (OUTPATIENT)
Dept: WOMENS IMAGING | Age: 72
Discharge: HOME | End: 2022-10-25
Payer: MEDICARE

## 2022-10-25 DIAGNOSIS — Z78.0 POSTMENOPAUSAL STATUS (AGE-RELATED) (NATURAL): ICD-10-CM

## 2022-10-25 DIAGNOSIS — Z78.0 MENOPAUSE: ICD-10-CM

## 2022-10-25 DIAGNOSIS — Z12.31 BREAST CANCER SCREENING BY MAMMOGRAM: ICD-10-CM

## 2022-10-25 PROCEDURE — 77080 DXA BONE DENSITY AXIAL: CPT

## 2022-10-25 PROCEDURE — 77063 BREAST TOMOSYNTHESIS BI: CPT

## 2022-12-02 ENCOUNTER — HOSPITAL ENCOUNTER (EMERGENCY)
Age: 72
Discharge: HOME | End: 2022-12-02
Attending: EMERGENCY MEDICINE
Payer: MEDICARE

## 2022-12-02 VITALS
RESPIRATION RATE: 16 BRPM | DIASTOLIC BLOOD PRESSURE: 66 MMHG | HEIGHT: 60 IN | BODY MASS INDEX: 34.95 KG/M2 | WEIGHT: 178 LBS | HEART RATE: 97 BPM | OXYGEN SATURATION: 97 % | SYSTOLIC BLOOD PRESSURE: 144 MMHG | TEMPERATURE: 100.1 F

## 2022-12-02 DIAGNOSIS — J02.9 ACUTE PHARYNGITIS, UNSPECIFIED ETIOLOGY: ICD-10-CM

## 2022-12-02 DIAGNOSIS — R50.9 ACUTE FEBRILE ILLNESS: Primary | ICD-10-CM

## 2022-12-02 LAB
FLU A ANTIGEN: NEGATIVE
FLU B ANTIGEN: NEGATIVE
SARS-COV-2, NAA: NOT  DETECTED

## 2022-12-02 PROCEDURE — 87635 SARS-COV-2 COVID-19 AMP PRB: CPT

## 2022-12-02 PROCEDURE — 99213 OFFICE O/P EST LOW 20 MIN: CPT

## 2022-12-02 PROCEDURE — 99213 OFFICE O/P EST LOW 20 MIN: CPT | Performed by: EMERGENCY MEDICINE

## 2022-12-02 PROCEDURE — 87804 INFLUENZA ASSAY W/OPTIC: CPT

## 2022-12-02 RX ORDER — BENZONATATE 100 MG/1
100 CAPSULE ORAL 3 TIMES DAILY PRN
Status: DISCONTINUED | OUTPATIENT
Start: 2022-12-02 | End: 2022-12-02

## 2022-12-02 RX ORDER — CEFDINIR 300 MG/1
300 CAPSULE ORAL EVERY 12 HOURS SCHEDULED
Status: DISCONTINUED | OUTPATIENT
Start: 2022-12-02 | End: 2022-12-02

## 2022-12-02 RX ORDER — CEFDINIR 300 MG/1
300 CAPSULE ORAL 2 TIMES DAILY
Qty: 14 CAPSULE | Refills: 0 | Status: SHIPPED | OUTPATIENT
Start: 2022-12-02 | End: 2022-12-09

## 2022-12-02 RX ORDER — BENZONATATE 100 MG/1
100 CAPSULE ORAL 2 TIMES DAILY PRN
Qty: 14 CAPSULE | Refills: 0 | Status: SHIPPED | OUTPATIENT
Start: 2022-12-02 | End: 2022-12-09

## 2022-12-02 ASSESSMENT — ENCOUNTER SYMPTOMS
WHEEZING: 0
SHORTNESS OF BREATH: 0
COUGH: 1
VOMITING: 0
ABDOMINAL PAIN: 0
NAUSEA: 0
SORE THROAT: 1
RHINORRHEA: 1
DIARRHEA: 0

## 2022-12-02 ASSESSMENT — PAIN DESCRIPTION - DESCRIPTORS: DESCRIPTORS: SORE

## 2022-12-02 ASSESSMENT — PAIN DESCRIPTION - LOCATION: LOCATION: THROAT

## 2022-12-02 ASSESSMENT — PAIN SCALES - GENERAL: PAINLEVEL_OUTOF10: 4

## 2022-12-02 ASSESSMENT — PAIN DESCRIPTION - ONSET: ONSET: ON-GOING

## 2022-12-02 ASSESSMENT — PAIN DESCRIPTION - PAIN TYPE: TYPE: ACUTE PAIN

## 2022-12-02 ASSESSMENT — PAIN DESCRIPTION - FREQUENCY: FREQUENCY: CONTINUOUS

## 2022-12-02 ASSESSMENT — PAIN - FUNCTIONAL ASSESSMENT: PAIN_FUNCTIONAL_ASSESSMENT: 0-10

## 2022-12-02 NOTE — ED TRIAGE NOTES
[facility]      CHIEF COMPLAINT       Chief Complaint   Patient presents with    Pharyngitis    Cough    Nasal Congestion       Nurses Notes reviewed and I agree except as noted in the HPI. HISTORY OF PRESENT ILLNESS   Kenrick Horton is a 74 y.o. female who presents with cough, congestion and pharyngitis. Patient states that she has had 1 week of the symptoms as above. She states that the she was feeling sick around 4 weeks ago and eventually got better, however they had many family gatherings and has subsequently had cough, congestion and sore throat. She is also had a measured fever here today, however the patient does not feel that she has a fever at home. She has not tested herself for COVID at home and she has had 3 doses of the COVID-19 vaccine. She has not vaccine against the flu as at this point this year. She denies shortness of breath, nausea, vomiting, diarrhea. REVIEW OF SYSTEMS     Review of Systems   HENT:  Positive for congestion, rhinorrhea and sore throat. Respiratory:  Positive for cough. Negative for shortness of breath and wheezing. Cardiovascular:  Negative for chest pain. Gastrointestinal:  Negative for abdominal pain, diarrhea, nausea and vomiting. PAST MEDICAL HISTORY         Diagnosis Date    Cancer [facility])     skin    GERD (gastroesophageal reflux disease)     Hyperlipidemia     Hypertension        SURGICAL HISTORY     Patient  has a past surgical history that includes Cholecystectomy (06/11/2011); Colonoscopy (07/12/2012); skin biopsy; Dilation and curettage of uterus (2010 - 2013); and Mohs surgery (Right, 04/28/2017).     CURRENT MEDICATIONS       Discharge Medication List as of 12/2/2022  9:59 AM        CONTINUE these medications which have NOT CHANGED    Details   Dextromethorphan-guaiFENesin (CORICIDIN HBP CONGESTION/COUGH)  MG CAPS Take 1 capsule by mouth 2 times daily as needed (cough/congestion), Disp-60 capsule, R-0Normal      alendronate (FOSAMAX) 70 MG tablet Take 70 mg by mouth every 7 daysHistorical Med      atorvastatin (LIPITOR) 20 MG tablet Take 20 mg by mouth dailyHistorical Med      clobetasol (TEMOVATE) 0.05 % cream Apply topically 2 times daily Apply topically 2 times daily. hands, Topical, 2 TIMES DAILY, Until Discontinued, Historical Med      losartan-hydrochlorothiazide (HYZAAR) 100-12.5 MG per tablet Take 1 tablet by mouth daily      Multiple Vitamins-Minerals (THERAPEUTIC MULTIVITAMIN-MINERALS) tablet Take 1 tablet by mouth daily      calcium carbonate (OSCAL) 500 MG TABS tablet Take 500 mg by mouth daily      vitamin D (CHOLECALCIFEROL) 1000 UNIT TABS tablet Take 1,000 Units by mouth daily             ALLERGIES     Patient is is allergic to other, penicillins, and sulfa antibiotics. FAMILY HISTORY     Patient'sfamily history includes Breast Cancer in her paternal aunt. SOCIAL HISTORY     Patient  reports that she has never smoked. She has never used smokeless tobacco. She reports that she does not drink alcohol and does not use drugs. PHYSICAL EXAM     ED TRIAGE VITALS  BP: (!) 144/66, Temp: 100.1 °F (37.8 °C), Heart Rate: 97, Resp: 16, SpO2: 97 %  Physical Exam  Vitals reviewed. HENT:      Head: Normocephalic and atraumatic. Nose: Nose normal.      Mouth/Throat:      Mouth: Mucous membranes are moist.      Pharynx: Oropharynx is clear. No pharyngeal swelling or oropharyngeal exudate. Eyes:      General: No scleral icterus. Right eye: No discharge. Left eye: No discharge. Extraocular Movements: Extraocular movements intact. Conjunctiva/sclera: Conjunctivae normal.   Cardiovascular:      Rate and Rhythm: Normal rate and regular rhythm. Heart sounds: Normal heart sounds. No murmur heard. No friction rub. No gallop. Pulmonary:      Effort: Pulmonary effort is normal. No respiratory distress. Breath sounds: Normal breath sounds. No stridor.  No wheezing, rhonchi or rales. Chest:      Chest wall: No tenderness. Abdominal:      General: Abdomen is flat. There is no distension. Palpations: Abdomen is soft. Tenderness: There is no abdominal tenderness. There is no rebound. Musculoskeletal:         General: Normal range of motion. Cervical back: Normal range of motion. Skin:     General: Skin is warm and dry. Neurological:      General: No focal deficit present. Mental Status: She is alert and oriented to person, place, and time. Mental status is at baseline. Psychiatric:         Mood and Affect: Mood normal.         Behavior: Behavior normal.         Thought Content: Thought content normal.         Judgment: Judgment normal.       DIAGNOSTIC RESULTS   Labs:  Results for orders placed or performed during the hospital encounter of 12/02/22   COVID-19, Rapid   Result Value Ref Range    SARS-CoV-2, BIANCA NOT  DETECTED NOT DETECTED   Rapid influenza A/B antigens   Result Value Ref Range    Flu A Antigen Negative NEGATIVE    Flu B Antigen Negative NEGATIVE       IMAGING:  No orders to display      URGENT CARE COURSE:     Vitals:    12/02/22 0901   BP: (!) 144/66   Pulse: 97   Resp: 16   Temp: 100.1 °F (37.8 °C)   TempSrc: Oral   SpO2: 97%   Weight: 178 lb (80.7 kg)   Height: 5' (1.524 m)       Medications - No data to display  PROCEDURES:  None  FINAL IMPRESSION      1. Acute febrile illness    2. Acute pharyngitis, unspecified etiology        DISPOSITION/PLAN   DISPOSITION Decision To Discharge 12/02/2022 09:54:12 AM    COVID and flu negative. Complete unspecified viral URI. Because of patient's febrile illness, will send home with MANUEL CORNELIUS for 5 days. We will also send patient with Amish Álvarez. Patient was instructed to go to ED if she has worsening shortness of breath. She is also instructed to follow-up with her PCP should her symptoms worsen. Voiced understanding.   PATIENT REFERRED TO:  Giana Lee  [address]  [place]  [phone]      If symptoms worsen  DISCHARGE MEDICATIONS:  Discharge Medication List as of 12/2/2022  9:59 AM        START taking these medications    Details   cefdinir (OMNICEF) 300 MG capsule Take 1 capsule by mouth 2 times daily for 7 days, Disp-14 capsule, R-0Normal      benzonatate (TESSALON) 100 MG capsule Take 1 capsule by mouth 2 times daily as needed for Cough, Disp-14 capsule, R-0Normal           Discharge Medication List as of 12/2/2022  9:59 AM          Rose Talavera DO

## 2022-12-02 NOTE — ED PROVIDER NOTES
[facility] 36  Urgent Care Encounter        CHIEF COMPLAINT            Chief Complaint   Patient presents with    Pharyngitis    Cough    Nasal Congestion         Nurses Notes reviewed and I agree except as noted in the HPI. HISTORY OF PRESENT ILLNESS   Meryle Leber is a 74 y.o. female who presents with cough, congestion and pharyngitis. Patient states that she has had 1 week of the symptoms as above. She states that the she was feeling sick around 4 weeks ago and eventually got better, however they had many family gatherings and has subsequently had cough, congestion and sore throat. She is also had a measured fever here today, however the patient does not feel that she has a fever at home. She has not tested herself for COVID at home and she has had 3 doses of the COVID-19 vaccine. She has not vaccine against the flu as at this point this year. She denies shortness of breath, nausea, vomiting, diarrhea. REVIEW OF SYSTEMS     Review of Systems   HENT:  Positive for congestion, rhinorrhea and sore throat. Respiratory:  Positive for cough. Negative for shortness of breath and wheezing. Cardiovascular:  Negative for chest pain. Gastrointestinal:  Negative for abdominal pain, diarrhea, nausea and vomiting. PAST MEDICAL HISTORY      Past Medical History             Diagnosis Date    Cancer [facility])       skin    GERD (gastroesophageal reflux disease)      Hyperlipidemia      Hypertension              SURGICAL HISTORY     Patient  has a past surgical history that includes Cholecystectomy (06/11/2011); Colonoscopy (07/12/2012); skin biopsy; Dilation and curettage of uterus (2010 - 2013); and Mohs surgery (Right, 04/28/2017).      CURRENT MEDICATIONS        Discharge Medication List as of 12/2/2022  9:59 AM              CONTINUE these medications which have NOT CHANGED     Details   Dextromethorphan-guaiFENesin (CORICIDIN HBP CONGESTION/COUGH)  MG CAPS Take 1 capsule by mouth 2 times daily as needed (cough/congestion), Disp-60 capsule, R-0Normal       alendronate (FOSAMAX) 70 MG tablet Take 70 mg by mouth every 7 daysHistorical Med       atorvastatin (LIPITOR) 20 MG tablet Take 20 mg by mouth dailyHistorical Med       clobetasol (TEMOVATE) 0.05 % cream Apply topically 2 times daily Apply topically 2 times daily. hands, Topical, 2 TIMES DAILY, Until Discontinued, Historical Med       losartan-hydrochlorothiazide (HYZAAR) 100-12.5 MG per tablet Take 1 tablet by mouth daily       Multiple Vitamins-Minerals (THERAPEUTIC MULTIVITAMIN-MINERALS) tablet Take 1 tablet by mouth daily       calcium carbonate (OSCAL) 500 MG TABS tablet Take 500 mg by mouth daily       vitamin D (CHOLECALCIFEROL) 1000 UNIT TABS tablet Take 1,000 Units by mouth daily                 ALLERGIES     Patient is is allergic to other, penicillins, and sulfa antibiotics. FAMILY HISTORY     Patient'sfamily history includes Breast Cancer in her paternal aunt. SOCIAL HISTORY     Patient  reports that she has never smoked. She has never used smokeless tobacco. She reports that she does not drink alcohol and does not use drugs. PHYSICAL EXAM     ED TRIAGE VITALS  BP: (!) 144/66, Temp: 100.1 °F (37.8 °C), Heart Rate: 97, Resp: 16, SpO2: 97 %  Physical Exam  Vitals reviewed. HENT:      Head: Normocephalic and atraumatic. Nose: Nose normal.      Mouth/Throat:      Mouth: Mucous membranes are moist.      Pharynx: Oropharynx is clear. No pharyngeal swelling or oropharyngeal exudate. Eyes:      General: No scleral icterus. Right eye: No discharge. Left eye: No discharge. Extraocular Movements: Extraocular movements intact. Conjunctiva/sclera: Conjunctivae normal.   Cardiovascular:      Rate and Rhythm: Normal rate and regular rhythm. Heart sounds: Normal heart sounds. No murmur heard. No friction rub. No gallop.    Pulmonary:      Effort: Pulmonary effort is normal. No respiratory distress. Breath sounds: Normal breath sounds. No stridor. No wheezing, rhonchi or rales. Chest:      Chest wall: No tenderness. Abdominal:      General: Abdomen is flat. There is no distension. Palpations: Abdomen is soft. Tenderness: There is no abdominal tenderness. There is no rebound. Musculoskeletal:         General: Normal range of motion. Cervical back: Normal range of motion. Skin:     General: Skin is warm and dry. Neurological:      General: No focal deficit present. Mental Status: She is alert and oriented to person, place, and time. Mental status is at baseline. Psychiatric:         Mood and Affect: Mood normal.         Behavior: Behavior normal.         Thought Content: Thought content normal.         Judgment: Judgment normal.         DIAGNOSTIC RESULTS   Labs:        Results for orders placed or performed during the hospital encounter of 12/02/22   COVID-19, Rapid   Result Value Ref Range     SARS-CoV-2, BIANCA NOT  DETECTED NOT DETECTED   Rapid influenza A/B antigens   Result Value Ref Range     Flu A Antigen Negative NEGATIVE     Flu B Antigen Negative NEGATIVE         IMAGING:  No orders to display      URGENT CARE COURSE:      Vitals       Vitals:     12/02/22 0901   BP: (!) 144/66   Pulse: 97   Resp: 16   Temp: 100.1 °F (37.8 °C)   TempSrc: Oral   SpO2: 97%   Weight: 178 lb (80.7 kg)   Height: 5' (1.524 m)            Medications - No data to display  PROCEDURES:  None  FINAL IMPRESSION       1. Acute febrile illness    2. Acute pharyngitis, unspecified etiology          DISPOSITION/PLAN   DISPOSITION Decision To Discharge 12/02/2022 09:54:12 AM     COVID and flu negative. Complete unspecified viral URI. Because of patient's febrile illness, will send home with MANUEL CORNELIUS for 5 days. We will also send patient with Amish Álvarez. Patient was instructed to go to ED if she has worsening shortness of breath.   She is also instructed to follow-up with her PCP should her symptoms worsen. Voiced understanding.   PATIENT REFERRED TO:  Jameson Villalta  [address]  [place] [address]  [phone]        If symptoms worsen  DISCHARGE MEDICATIONS:  Discharge Medication List as of 12/2/2022  9:59 AM              START taking these medications     Details   cefdinir (OMNICEF) 300 MG capsule Take 1 capsule by mouth 2 times daily for 7 days, Disp-14 capsule, R-0Normal       benzonatate (TESSALON) 100 MG capsule Take 1 capsule by mouth 2 times daily as needed for Cough, Disp-14 capsule, R-0Normal              Discharge Medication List as of 12/2/2022  9:59 AM             DO Stephany Delgado,   Resident  12/02/22 8587

## 2022-12-02 NOTE — ED PROVIDER NOTES
[phone]     No chief complaint on file. Nurses Notes reviewed and I agree except as noted in the HPI. HISTORY OF PRESENT ILLNESS   Haseeb Juan is a 74 y.o. female who presents with fever. Juanpablo Dorman MD,  personally performed and participated in key or critical portions of the evaluation and management including personally performing the exam and medical decision making. I verify the accuracy of the documentation by the resident. Please review resident note for specifics and further details of this urgent care evaluation.      Electronically signed by Shane Gautam MD on 12/2/2022 at 9:01 AM       Shane Gautam MD  12/02/22 2028

## 2023-03-12 ENCOUNTER — HOSPITAL ENCOUNTER (EMERGENCY)
Age: 73
Discharge: HOME | End: 2023-03-12
Payer: MEDICARE

## 2023-03-12 VITALS
OXYGEN SATURATION: 98 % | HEART RATE: 88 BPM | TEMPERATURE: 99.4 F | RESPIRATION RATE: 18 BRPM | WEIGHT: 179 LBS | HEIGHT: 60 IN | SYSTOLIC BLOOD PRESSURE: 138 MMHG | DIASTOLIC BLOOD PRESSURE: 83 MMHG | BODY MASS INDEX: 35.14 KG/M2

## 2023-03-12 DIAGNOSIS — J02.9 ACUTE PHARYNGITIS, UNSPECIFIED ETIOLOGY: Primary | ICD-10-CM

## 2023-03-12 DIAGNOSIS — J06.9 VIRAL URI WITH COUGH: ICD-10-CM

## 2023-03-12 LAB — S PYO AG THROAT QL: NEGATIVE

## 2023-03-12 PROCEDURE — 87651 STREP A DNA AMP PROBE: CPT

## 2023-03-12 PROCEDURE — 99213 OFFICE O/P EST LOW 20 MIN: CPT | Performed by: NURSE PRACTITIONER

## 2023-03-12 PROCEDURE — 99213 OFFICE O/P EST LOW 20 MIN: CPT

## 2023-03-12 ASSESSMENT — PAIN DESCRIPTION - DESCRIPTORS: DESCRIPTORS: SORE

## 2023-03-12 ASSESSMENT — PAIN DESCRIPTION - LOCATION: LOCATION: THROAT

## 2023-03-12 ASSESSMENT — ENCOUNTER SYMPTOMS
WHEEZING: 0
DIARRHEA: 0
EYE DISCHARGE: 0
TROUBLE SWALLOWING: 0
NAUSEA: 0
VOMITING: 0
RHINORRHEA: 1
EYE REDNESS: 0
CHEST TIGHTNESS: 0
COUGH: 1
SORE THROAT: 1
SHORTNESS OF BREATH: 0

## 2023-03-12 ASSESSMENT — PAIN DESCRIPTION - FREQUENCY: FREQUENCY: CONTINUOUS

## 2023-03-12 ASSESSMENT — PAIN - FUNCTIONAL ASSESSMENT
PAIN_FUNCTIONAL_ASSESSMENT: 0-10
PAIN_FUNCTIONAL_ASSESSMENT: ACTIVITIES ARE NOT PREVENTED

## 2023-03-12 ASSESSMENT — PAIN DESCRIPTION - PAIN TYPE: TYPE: ACUTE PAIN

## 2023-03-12 ASSESSMENT — PAIN DESCRIPTION - ONSET: ONSET: GRADUAL

## 2023-03-12 ASSESSMENT — PAIN SCALES - GENERAL: PAINLEVEL_OUTOF10: 6

## 2023-03-12 NOTE — ED PROVIDER NOTES
[facility] 36  Urgent Care Encounter      CHIEF COMPLAINT       Chief Complaint   Patient presents with    Cough    Pharyngitis       Nurses Notes reviewed and I agree except as noted in the HPI. HISTORY OF PRESENT ILLNESS   Daniela Nicole is a 74 y.o. female who presents for evaluation of cough and sore throat. Onset of symptoms over the past 4 to 5 days, unchanged. Cough is intermittent, dry. Cough is worse at nighttime. Associated sore throat, fever. Fever subjective. Currently 99.4. Recent exposure to strep throat. Patient was placed on Zithromax by PCP. Minimal improvement with current treatment. REVIEW OF SYSTEMS     Review of Systems   Constitutional:  Positive for chills and fever. Negative for diaphoresis and fatigue. HENT:  Positive for congestion, postnasal drip, rhinorrhea and sore throat. Negative for ear pain and trouble swallowing. Eyes:  Negative for discharge and redness. Respiratory:  Positive for cough. Negative for chest tightness, shortness of breath and wheezing. Cardiovascular:  Negative for chest pain. Gastrointestinal:  Negative for diarrhea, nausea and vomiting. Genitourinary:  Negative for decreased urine volume. Musculoskeletal:  Negative for neck pain and neck stiffness. Skin:  Negative for rash. Neurological:  Negative for headaches. Hematological:  Negative for adenopathy. Psychiatric/Behavioral:  Negative for sleep disturbance. PAST MEDICAL HISTORY         Diagnosis Date    Cancer [facility])     skin    GERD (gastroesophageal reflux disease)     Hyperlipidemia     Hypertension        SURGICAL HISTORY     Patient  has a past surgical history that includes Cholecystectomy (06/11/2011); Colonoscopy (07/12/2012); skin biopsy; Dilation and curettage of uterus (2010 - 2013); and Mohs surgery (Right, 04/28/2017).     CURRENT MEDICATIONS       Previous Medications    ALENDRONATE (FOSAMAX) 70 MG TABLET    Take 70 mg by mouth every 7 days    ATORVASTATIN (LIPITOR) 20 MG TABLET    Take 20 mg by mouth daily    CALCIUM CARBONATE (OSCAL) 500 MG TABS TABLET    Take 500 mg by mouth daily    CLOBETASOL (TEMOVATE) 0.05 % CREAM    Apply topically 2 times daily Apply topically 2 times daily. hands    DEXTROMETHORPHAN-GUAIFENESIN (CORICIDIN HBP CONGESTION/COUGH)  MG CAPS    Take 1 capsule by mouth 2 times daily as needed (cough/congestion)    LOSARTAN-HYDROCHLOROTHIAZIDE (HYZAAR) 100-12.5 MG PER TABLET    Take 1 tablet by mouth daily    MULTIPLE VITAMINS-MINERALS (THERAPEUTIC MULTIVITAMIN-MINERALS) TABLET    Take 1 tablet by mouth daily    VITAMIN D (CHOLECALCIFEROL) 1000 UNIT TABS TABLET    Take 1,000 Units by mouth daily       ALLERGIES     Patient is is allergic to other, penicillins, and sulfa antibiotics. FAMILY HISTORY     Patient'sfamily history includes Breast Cancer in her paternal aunt. SOCIAL HISTORY     Patient  reports that she has never smoked. She has never used smokeless tobacco. She reports that she does not drink alcohol and does not use drugs. PHYSICAL EXAM     ED TRIAGE VITALS  BP: 138/83, Temp: 99.4 °F (37.4 °C), Heart Rate: 88, Resp: 18, SpO2: 98 %  Physical Exam  Vitals and nursing note reviewed. Constitutional:       General: She is not in acute distress. Appearance: Normal appearance. She is well-developed. She is not ill-appearing, toxic-appearing or diaphoretic. HENT:      Head: Normocephalic and atraumatic. Right Ear: Hearing, tympanic membrane, ear canal and external ear normal. No mastoid tenderness. No hemotympanum. Tympanic membrane is not perforated, erythematous or bulging. Left Ear: Hearing, tympanic membrane, ear canal and external ear normal. No mastoid tenderness. No hemotympanum. Tympanic membrane is not perforated, erythematous or bulging. Nose: Nose normal.      Mouth/Throat:      Mouth: Mucous membranes are moist.      Pharynx: Oropharynx is clear. Uvula midline. Tonsils: No tonsillar abscesses.   Eyes:      General: No scleral icterus.     Conjunctiva/sclera: Conjunctivae normal.      Right eye: Right conjunctiva is not injected. No hemorrhage.     Left eye: Left conjunctiva is not injected. No hemorrhage.  Neck:      Thyroid: No thyromegaly.      Trachea: Trachea normal.   Cardiovascular:      Rate and Rhythm: Normal rate and regular rhythm. No extrasystoles are present.     Chest Wall: PMI is not displaced.      Heart sounds: Normal heart sounds. No murmur heard.    No friction rub. No gallop.   Pulmonary:      Effort: Pulmonary effort is normal. No respiratory distress.      Breath sounds: Normal breath sounds.   Musculoskeletal:      Cervical back: Normal range of motion and neck supple.   Lymphadenopathy:      Head:      Right side of head: No submental, submandibular, tonsillar or occipital adenopathy.      Left side of head: No submental, submandibular, tonsillar or occipital adenopathy.      Cervical: No cervical adenopathy.      Upper Body:      Right upper body: No supraclavicular adenopathy.      Left upper body: No supraclavicular adenopathy.   Skin:     General: Skin is warm and dry.      Capillary Refill: Capillary refill takes less than 2 seconds.      Coloration: Skin is not pale.      Findings: No rash.      Comments: Skin warm and dry to touch, no rashes noted on exposed surfaces.   Neurological:      Mental Status: She is alert and oriented to person, place, and time. She is not disoriented.   Psychiatric:         Mood and Affect: Mood normal.         Behavior: Behavior is cooperative.       DIAGNOSTIC RESULTS   Labs:  Results for orders placed or performed during the hospital encounter of 03/12/23   Strep Screen Group A Throat   Result Value Ref Range    Rapid Strep A Screen NEGATIVE        IMAGING:  No orders to display      URGENT CARE COURSE:     Vitals:    03/12/23 1030   BP: 138/83   Pulse: 88   Resp: 18   Temp: 99.4 °F (37.4 °C)   TempSrc: Temporal  SpO2: 98%   Weight: 179 lb (81.2 kg)   Height: 5' (1.524 m)       Medications - No data to display  PROCEDURES:  None  FINAL IMPRESSION      1. Acute pharyngitis, unspecified etiology    2. Viral URI with cough        DISPOSITION/PLAN   DISPOSITION Decision To Discharge 03/12/2023 10:52:16 AM    Nontoxic, no distress. Strep negative. No abnormal lung sounds. Moderate postnasal drainage. Continue current treatment. Increase fluids, rest.  Mucinex over-the-counter. If symptoms worsen go to ER. PATIENT REFERRED TO:  Kayla [address]  [place] [address]  [phone]      Follow-up as needed. Continue current treatment. Increase fluids, rest.  If any distress go to ER.     DISCHARGE MEDICATIONS:  New Prescriptions    No medications on file     Current Discharge Medication List          [address] [facility], APRN - [facility]  03/12/23 0828

## 2023-03-12 NOTE — ED TRIAGE NOTES
Pt to [facility] ambulatory with a sore throat, and cough. This started on Thursday.

## 2023-11-07 ENCOUNTER — HOSPITAL ENCOUNTER (OUTPATIENT)
Dept: WOMENS IMAGING | Age: 73
Discharge: HOME | End: 2023-11-07
Payer: MEDICARE

## 2023-11-07 VITALS — WEIGHT: 179 LBS | HEIGHT: 60 IN | BODY MASS INDEX: 35.14 KG/M2

## 2023-11-07 DIAGNOSIS — Z12.31 VISIT FOR SCREENING MAMMOGRAM: ICD-10-CM

## 2023-11-07 PROCEDURE — 77063 BREAST TOMOSYNTHESIS BI: CPT

## 2023-12-14 ENCOUNTER — HOSPITAL ENCOUNTER (OUTPATIENT)
Dept: MRI IMAGING | Age: 73
Discharge: HOME | End: 2023-12-14
Attending: ORTHOPAEDIC SURGERY
Payer: MEDICARE

## 2023-12-14 DIAGNOSIS — M47.22 OTHER SPONDYLOSIS WITH RADICULOPATHY, CERVICAL REGION: ICD-10-CM

## 2023-12-14 PROCEDURE — 72141 MRI NECK SPINE W/O DYE: CPT

## 2024-03-08 ENCOUNTER — HOSPITAL ENCOUNTER (EMERGENCY)
Age: 74
Discharge: HOME | End: 2024-03-08
Payer: MEDICARE

## 2024-03-08 VITALS
SYSTOLIC BLOOD PRESSURE: 158 MMHG | OXYGEN SATURATION: 97 % | RESPIRATION RATE: 16 BRPM | HEART RATE: 92 BPM | TEMPERATURE: 97.1 F | DIASTOLIC BLOOD PRESSURE: 85 MMHG

## 2024-03-08 DIAGNOSIS — I83.892 VARICOSE VEINS OF LEG WITH SWELLING, LEFT: Primary | ICD-10-CM

## 2024-03-08 DIAGNOSIS — I10 ELEVATED SYSTOLIC BLOOD PRESSURE READING WITH DIAGNOSIS OF HYPERTENSION: ICD-10-CM

## 2024-03-08 PROCEDURE — 99213 OFFICE O/P EST LOW 20 MIN: CPT

## 2024-03-08 NOTE — DISCHARGE INSTRUCTIONS
Compression stocking usage  Elevate legs  Continue prescribed medications as prescribed  To call PCP today for follow up today or within the week.

## 2024-03-08 NOTE — ED PROVIDER NOTES
[facility]  Urgent Care Encounter       CHIEF COMPLAINT       Chief Complaint   Patient presents with    Leg Swelling       Nurses Notes reviewed and I agree except as noted in the HPI.  HISTORY OF PRESENT ILLNESS   Nathaniel Carolina is a 73 y.o. female who presents with left leg swelling that she noticed today. Reports she has been having ongoing left knee pain as well, located at MCL, reports had right MCL repaired a few years prior and the knee pain feels the same. Reports no use of medication for symptom management.      HPI    REVIEW OF SYSTEMS     Review of Systems   Constitutional:  Negative for fatigue and fever.   Musculoskeletal:         Left leg swelling - free of pain at location.    Left knee pain   All other systems reviewed and are negative.      PAST MEDICAL HISTORY         Diagnosis Date    Cancer (HCC)     skin    GERD (gastroesophageal reflux disease)     Hyperlipidemia     Hypertension        SURGICALHISTORY     Patient  has a past surgical history that includes Cholecystectomy (06/11/2011); Colonoscopy (07/12/2012); skin biopsy; Dilation and curettage of uterus (2010 - 2013); and Mohs surgery (Right, 04/28/2017).    CURRENT MEDICATIONS       Discharge Medication List as of 3/8/2024  9:49 AM        CONTINUE these medications which have NOT CHANGED    Details   Dextromethorphan-guaiFENesin (CORICIDIN HBP CONGESTION/COUGH)  MG CAPS Take 1 capsule by mouth 2 times daily as needed (cough/congestion), Disp-60 capsule, R-0Normal      alendronate (FOSAMAX) 70 MG tablet Take 70 mg by mouth every 7 daysHistorical Med      atorvastatin (LIPITOR) 20 MG tablet Take 20 mg by mouth dailyHistorical Med      clobetasol (TEMOVATE) 0.05 % cream Apply topically 2 times daily Apply topically 2 times daily.   hands, Topical, 2 TIMES DAILY, Until Discontinued, Historical Med      losartan-hydrochlorothiazide (HYZAAR) 100-12.5 MG per tablet Take 1 tablet by mouth daily      Multiple  recommend that the patient call the primary care provider listed on their discharge instructions or a physician of their choice this week to arrange follow up for further evaluation of possible pre-hypertension or hypertension. If they develop any chest pain, shortness of breath, or any diaphoresis they're to dial 911 or go to the emergency department for further evaluation.  They are agreeable to the treatment plan and left ambulatory without any problems.        PATIENT REFERRED TO:  Chapin Boswell S  [address] [facility] [address] / [facility] [address]      DISCHARGE MEDICATIONS:  Discharge Medication List as of 3/8/2024  9:49 AM          Discharge Medication List as of 3/8/2024  9:49 AM          Discharge Medication List as of 3/8/2024  9:49 AM          CESAR Tiwari CNP    (Please note that portions of this note were completed with a voice recognition program. Efforts were made to edit the dictations but occasionally words are mis-transcribed.)            Lary Alfredo APRN - CNP  03/08/24 1012

## 2024-03-08 NOTE — ED NOTES
To [facility] with complaints of left lower leg swelling below knee that started today. States she has been having knee pain for a few weeks but it doesn't hurt much now. But today has got swelling below knee. Some swelling in right leg in same area but not as much. No pain     Teresa Kang, RN  03/08/24 7458

## 2024-05-22 ENCOUNTER — HOSPITAL ENCOUNTER (EMERGENCY)
Age: 74
Discharge: HOME | End: 2024-05-22
Payer: MEDICARE

## 2024-05-22 VITALS
RESPIRATION RATE: 20 BRPM | SYSTOLIC BLOOD PRESSURE: 148 MMHG | TEMPERATURE: 98.2 F | DIASTOLIC BLOOD PRESSURE: 84 MMHG | OXYGEN SATURATION: 100 % | HEART RATE: 88 BPM

## 2024-05-22 DIAGNOSIS — Z87.440 PERSONAL HISTORY OF URINARY TRACT INFECTION: ICD-10-CM

## 2024-05-22 DIAGNOSIS — R31.0 GROSS HEMATURIA: Primary | ICD-10-CM

## 2024-05-22 DIAGNOSIS — R10.2 SUPRAPUBIC PAIN: ICD-10-CM

## 2024-05-22 LAB
BACTERIA: ABNORMAL
BILIRUB UR QL STRIP: NEGATIVE
CASTS #/AREA URNS LPF: ABNORMAL /LPF
CASTS #/AREA URNS LPF: ABNORMAL /LPF
CHARACTER UR: ABNORMAL
CHARCOAL URNS QL MICRO: ABNORMAL
COLOR UR: ABNORMAL
CRYSTALS URNS QL MICRO: ABNORMAL
EPITHELIAL CELLS, UA: ABNORMAL /HPF
GLUCOSE UR QL STRIP.AUTO: NEGATIVE MG/DL
HGB UR QL STRIP.AUTO: ABNORMAL
KETONES UR QL STRIP.AUTO: NEGATIVE
LEUKOCYTE ESTERASE UR QL STRIP.AUTO: ABNORMAL
NITRITE UR QL STRIP.AUTO: NEGATIVE
PH UR STRIP.AUTO: 5.5 [PH] (ref 5–9)
PROT UR STRIP.AUTO-MCNC: 300 MG/DL
RBC #/AREA URNS HPF: > 200 /HPF
RENAL EPI CELLS #/AREA URNS HPF: ABNORMAL /[HPF]
SP GR UR REFRACT.AUTO: 1.02 (ref 1–1.03)
UROBILINOGEN UR QL STRIP.AUTO: 0.2 EU/DL (ref 0–1)
WBC #/AREA URNS HPF: ABNORMAL /HPF
YEAST LIKE FUNGI URNS QL MICRO: ABNORMAL

## 2024-05-22 PROCEDURE — 87086 URINE CULTURE/COLONY COUNT: CPT

## 2024-05-22 PROCEDURE — 99213 OFFICE O/P EST LOW 20 MIN: CPT

## 2024-05-22 PROCEDURE — 81001 URINALYSIS AUTO W/SCOPE: CPT

## 2024-05-22 RX ORDER — NITROFURANTOIN 25; 75 MG/1; MG/1
100 CAPSULE ORAL 2 TIMES DAILY
Qty: 14 CAPSULE | Refills: 0 | Status: SHIPPED | OUTPATIENT
Start: 2024-05-22 | End: 2024-05-29

## 2024-05-22 NOTE — ED PROVIDER NOTES
[facility] URGENT CARE      URGENT CARE     Pt Name: Nathaniel Carolina  MRN: [id number]  Birthdate 1950  Date of evaluation: 5/22/2024  Provider: CESAR Donaldson CNP    Urgent Care Encounter     CHIEF COMPLAINT       Chief Complaint   Patient presents with    Hematuria    Abdominal Pain    Back Pain     HISTORY OF PRESENT ILLNESS   Nathaniel Carolina is a 73 y.o. female who presents to urgent care with chief complaint of gross hematuria, pelvic fullness and lower back pain.  Symptoms started this morning.  Denies history of the symptoms but states she has had urinary tract infection in the past, last infection was several years ago.  Denies antibiotics last month.  Admits to PCN/sulfa allergies.  Denies burning/urgency or frequency with urination.    History obtained from patient  URGENT CARE TIMELINE      ED Course as of 05/22/24 1649   Wed May 22, 2024   1641 BP(!): 148/84  Vital signs are stable.  Afebrile [JR]      ED Course User Index  [JR] Vignesh Yates APRN - CNP     PAST MEDICAL HISTORY         Diagnosis Date    Cancer (HCC)     skin    GERD (gastroesophageal reflux disease)     Hyperlipidemia     Hypertension      SURGICALHISTORY     Patient  has a past surgical history that includes Cholecystectomy (06/11/2011); Colonoscopy (07/12/2012); skin biopsy; Dilation and curettage of uterus (2010 - 2013); and Mohs surgery (Right, 04/28/2017).  CURRENT MEDICATIONS       Previous Medications    ALENDRONATE (FOSAMAX) 70 MG TABLET    Take 70 mg by mouth every 7 days    ATORVASTATIN (LIPITOR) 20 MG TABLET    Take 20 mg by mouth daily    CALCIUM CARBONATE (OSCAL) 500 MG TABS TABLET    Take 500 mg by mouth daily    CLOBETASOL (TEMOVATE) 0.05 % CREAM    Apply topically 2 times daily Apply topically 2 times daily.   hands    DEXTROMETHORPHAN-GUAIFENESIN (CORICIDIN HBP CONGESTION/COUGH)  MG CAPS    Take 1 capsule by mouth 2 times daily as needed (cough/congestion)

## 2024-05-22 NOTE — DISCHARGE INSTRUCTIONS
Take antibiotics until they are gone even if you are feeling better.  Please hydrate well keeping urine clear/pale yellow, this is the best way to treat UTIs.  Okay to use concomitant phenazopyridine (tradename Azo) to help with urinary tract symptoms for the next 1-2 days while antibiotics are kicking in.    We are sending urine for culture, if indicated we will call you in a few days to optimize/change antibiotics if indicated.    If you are experiencing uncontrolled pain, fevers, uncontrolled bleeding or any other urgent/emergent medical concerns please attend the ER for ongoing care.    If your symptoms fail to improve or worsen please see your family doctor ASAP.    I hope you are feeling better soon!

## 2024-05-22 NOTE — ED NOTES
Into instruct on obtaining new clean catch uirne  Pt not in room     Rosa Fuentes RN  05/22/24 [id number]

## 2024-05-22 NOTE — ED TRIAGE NOTES
Pt to UC with c/o hematuria, abdominal pain and lower back pain. Pt reports it started this morning

## 2024-05-24 LAB
BACTERIA UR CULT: ABNORMAL
ORGANISM: ABNORMAL

## 2024-09-03 ENCOUNTER — LAB (OUTPATIENT)
Dept: LAB | Age: 74
End: 2024-09-03

## 2024-09-03 LAB
ALBUMIN SERPL BCG-MCNC: 4.3 G/DL (ref 3.5–5.1)
ALP SERPL-CCNC: 95 U/L (ref 38–126)
ALT SERPL W/O P-5'-P-CCNC: 14 U/L (ref 11–66)
ANION GAP SERPL CALC-SCNC: 12 MEQ/L (ref 8–16)
AST SERPL-CCNC: 20 U/L (ref 5–40)
BILIRUB SERPL-MCNC: 0.3 MG/DL (ref 0.3–1.2)
BUN SERPL-MCNC: 19 MG/DL (ref 7–22)
CALCIUM SERPL-MCNC: 9.6 MG/DL (ref 8.5–10.5)
CHLORIDE SERPL-SCNC: 106 MEQ/L (ref 98–111)
CHOLEST SERPL-MCNC: 127 MG/DL (ref 100–199)
CO2 SERPL-SCNC: 25 MEQ/L (ref 23–33)
CREAT SERPL-MCNC: 1 MG/DL (ref 0.4–1.2)
GFR SERPL CREATININE-BSD FRML MDRD: 58 ML/MIN/1.73M2
GLUCOSE SERPL-MCNC: 91 MG/DL (ref 70–108)
HDLC SERPL-MCNC: 41 MG/DL
LDLC SERPL CALC-MCNC: 47 MG/DL
POTASSIUM SERPL-SCNC: 3.8 MEQ/L (ref 3.5–5.2)
PROT SERPL-MCNC: 7.4 G/DL (ref 6.1–8)
SODIUM SERPL-SCNC: 143 MEQ/L (ref 135–145)
TRIGL SERPL-MCNC: 197 MG/DL (ref 0–199)

## 2024-09-06 ENCOUNTER — LAB (OUTPATIENT)
Dept: LAB | Age: 74
End: 2024-09-06

## 2024-09-06 LAB
25(OH)D3 SERPL-MCNC: 66 NG/ML (ref 30–100)
ANION GAP SERPL CALC-SCNC: 12 MEQ/L (ref 8–16)
BACTERIA URNS QL MICRO: ABNORMAL /HPF
BILIRUB UR QL STRIP.AUTO: NEGATIVE
BUN SERPL-MCNC: 25 MG/DL (ref 7–22)
CALCIUM SERPL-MCNC: 10 MG/DL (ref 8.5–10.5)
CASTS #/AREA URNS LPF: ABNORMAL /LPF
CASTS 2: ABNORMAL /LPF
CHARACTER UR: CLEAR
CHLORIDE SERPL-SCNC: 104 MEQ/L (ref 98–111)
CO2 SERPL-SCNC: 26 MEQ/L (ref 23–33)
COLOR, UA: YELLOW
CREAT SERPL-MCNC: 1.1 MG/DL (ref 0.4–1.2)
CREAT UR-MCNC: 222.6 MG/DL
CRYSTALS URNS MICRO: ABNORMAL
DEPRECATED RDW RBC AUTO: 42.7 FL (ref 35–45)
EPITHELIAL CELLS, UA: ABNORMAL /HPF
ERYTHROCYTE [DISTWIDTH] IN BLOOD BY AUTOMATED COUNT: 12.9 % (ref 11.5–14.5)
GFR SERPL CREATININE-BSD FRML MDRD: 52 ML/MIN/1.73M2
GLUCOSE SERPL-MCNC: 100 MG/DL (ref 70–108)
GLUCOSE UR QL STRIP.AUTO: NEGATIVE MG/DL
HCT VFR BLD AUTO: 36.3 % (ref 37–47)
HGB BLD-MCNC: 12 GM/DL (ref 12–16)
HGB UR QL STRIP.AUTO: NEGATIVE
KETONES UR QL STRIP.AUTO: ABNORMAL
MAGNESIUM SERPL-MCNC: 1.8 MG/DL (ref 1.6–2.4)
MCH RBC QN AUTO: 29.7 PG (ref 26–33)
MCHC RBC AUTO-ENTMCNC: 33.1 GM/DL (ref 32.2–35.5)
MCV RBC AUTO: 89.9 FL (ref 81–99)
MICROALBUMIN UR-MCNC: 5.41 MG/DL
MICROALBUMIN/CREAT RATIO PNL UR: 24 MG/G (ref 0–30)
MISCELLANEOUS 2: ABNORMAL
NITRITE UR QL STRIP: NEGATIVE
PH UR STRIP.AUTO: 5.5 [PH] (ref 5–9)
PLATELET # BLD AUTO: 260 THOU/MM3 (ref 130–400)
PMV BLD AUTO: 10.5 FL (ref 9.4–12.4)
POTASSIUM SERPL-SCNC: 4.5 MEQ/L (ref 3.5–5.2)
PROT UR STRIP.AUTO-MCNC: ABNORMAL MG/DL
PROT UR-MCNC: 36 MG/DL
RBC # BLD AUTO: 4.04 MILL/MM3 (ref 4.2–5.4)
RBC URINE: ABNORMAL /HPF
RENAL EPI CELLS #/AREA URNS HPF: ABNORMAL /[HPF]
SODIUM SERPL-SCNC: 142 MEQ/L (ref 135–145)
SP GR UR REFRACT.AUTO: 1.02 (ref 1–1.03)
URATE SERPL-MCNC: 5.9 MG/DL (ref 2.4–5.7)
UROBILINOGEN, URINE: 0.2 EU/DL (ref 0–1)
WBC # BLD AUTO: 6.2 THOU/MM3 (ref 4.8–10.8)
WBC #/AREA URNS HPF: ABNORMAL /HPF
WBC #/AREA URNS HPF: ABNORMAL /[HPF]
YEAST LIKE FUNGI URNS QL MICRO: ABNORMAL

## 2024-09-12 ENCOUNTER — LAB (OUTPATIENT)
Dept: LAB | Age: 74
End: 2024-09-12

## 2024-09-12 LAB
ERYTHROCYTE [SEDIMENTATION RATE] IN BLOOD BY WESTERGREN METHOD: 30 MM/HR (ref 0–20)
RHEUMATOID FACT SERPL-ACNC: < 10 IU/ML (ref 0–13)
URATE SERPL-MCNC: 6.1 MG/DL (ref 2.4–5.7)

## 2024-09-15 LAB — NUCLEAR IGG SER QL IA: NORMAL

## 2024-10-10 ENCOUNTER — HOSPITAL ENCOUNTER (OUTPATIENT)
Dept: ULTRASOUND IMAGING | Age: 74
Discharge: HOME | End: 2024-10-10
Attending: INTERNAL MEDICINE
Payer: MEDICARE

## 2024-10-10 DIAGNOSIS — N18.31 STAGE 3A CHRONIC KIDNEY DISEASE (HCC): ICD-10-CM

## 2024-10-10 PROCEDURE — 76770 US EXAM ABDO BACK WALL COMP: CPT

## 2024-10-29 ENCOUNTER — HOSPITAL ENCOUNTER (OUTPATIENT)
Age: 74
Discharge: HOME | End: 2024-10-29
Attending: INTERNAL MEDICINE
Payer: MEDICARE

## 2024-10-29 ENCOUNTER — HOSPITAL ENCOUNTER (OUTPATIENT)
Dept: MRI IMAGING | Age: 74
Discharge: HOME | End: 2024-10-29
Attending: INTERNAL MEDICINE
Payer: MEDICARE

## 2024-10-29 DIAGNOSIS — N28.9 RENAL LESION: ICD-10-CM

## 2024-10-29 LAB
BILIRUB UR QL STRIP.AUTO: NEGATIVE
CHARACTER UR: CLEAR
COLOR, UA: YELLOW
GLUCOSE UR QL STRIP.AUTO: NEGATIVE MG/DL
HGB UR QL STRIP.AUTO: NEGATIVE
KETONES UR QL STRIP.AUTO: NEGATIVE
NITRITE UR QL STRIP: NEGATIVE
PH UR STRIP.AUTO: 7.5 [PH] (ref 5–9)
PROT UR STRIP.AUTO-MCNC: NEGATIVE MG/DL
SP GR UR REFRACT.AUTO: 1.01 (ref 1–1.03)
UROBILINOGEN, URINE: 0.2 EU/DL (ref 0–1)
WBC #/AREA URNS HPF: NEGATIVE /[HPF]

## 2024-10-29 PROCEDURE — 81003 URINALYSIS AUTO W/O SCOPE: CPT

## 2024-10-29 PROCEDURE — A9579 GAD-BASE MR CONTRAST NOS,1ML: HCPCS | Performed by: INTERNAL MEDICINE

## 2024-10-29 PROCEDURE — 88177 CYTP FNA EVAL EA ADDL: CPT

## 2024-10-29 PROCEDURE — 74183 MRI ABD W/O CNTR FLWD CNTR: CPT

## 2024-10-29 PROCEDURE — 88112 CYTOPATH CELL ENHANCE TECH: CPT

## 2024-10-29 PROCEDURE — 6360000004 HC RX CONTRAST MEDICATION: Performed by: INTERNAL MEDICINE

## 2024-10-29 RX ADMIN — GADOTERIDOL 15 ML: 279.3 INJECTION, SOLUTION INTRAVENOUS at 10:25

## 2024-11-19 ENCOUNTER — HOSPITAL ENCOUNTER (OUTPATIENT)
Dept: WOMENS IMAGING | Age: 74
Discharge: HOME | End: 2024-11-19
Payer: MEDICARE

## 2024-11-19 DIAGNOSIS — Z12.31 VISIT FOR SCREENING MAMMOGRAM: ICD-10-CM

## 2024-11-19 PROCEDURE — 77063 BREAST TOMOSYNTHESIS BI: CPT

## 2024-12-03 ENCOUNTER — LAB (OUTPATIENT)
Dept: LAB | Age: 74
End: 2024-12-03

## 2024-12-03 LAB
CORTIS SERPL-MCNC: 16.37 UG/DL
CORTISOL COLLECTION INFO: NORMAL

## 2024-12-05 LAB — ALDOST SERPL-MCNC: 16.7 NG/DL

## 2024-12-06 LAB
METANEPHRINES PLASMA: NORMAL
RENIN PLAS-CCNC: 3.4 NG/ML/HR

## 2024-12-07 ENCOUNTER — LAB (OUTPATIENT)
Dept: LAB | Age: 74
End: 2024-12-07

## 2024-12-07 LAB
HOURS COLLECTED: 24 HRS
SODIUM 24H UR-SRATE: 78 MEQ/24HR (ref 75–200)
SODIUM UR-SCNC: 71 MEQ/L
URINE VOLUME, 24 HOUR: 1100 ML

## 2024-12-12 LAB — METANEPHRINES TOTAL URINE: NORMAL

## 2025-09-02 ENCOUNTER — OFFICE VISIT (OUTPATIENT)
Age: 75
End: 2025-09-02
Payer: MEDICARE

## 2025-09-02 ENCOUNTER — HOSPITAL ENCOUNTER (OUTPATIENT)
Dept: OTHER | Age: 75
Discharge: HOME | End: 2025-09-02
Payer: MEDICARE

## 2025-09-02 ENCOUNTER — HOSPITAL ENCOUNTER (OUTPATIENT)
Dept: GENERAL RADIOLOGY | Age: 75
Discharge: HOME | End: 2025-09-02
Payer: MEDICARE

## 2025-09-02 VITALS
HEIGHT: 63 IN | HEART RATE: 75 BPM | BODY MASS INDEX: 32.14 KG/M2 | DIASTOLIC BLOOD PRESSURE: 74 MMHG | SYSTOLIC BLOOD PRESSURE: 124 MMHG | WEIGHT: 181.4 LBS | OXYGEN SATURATION: 97 %

## 2025-09-02 DIAGNOSIS — E83.52 HYPERCALCEMIA: ICD-10-CM

## 2025-09-02 DIAGNOSIS — M54.50 CHRONIC MIDLINE LOW BACK PAIN WITHOUT SCIATICA: ICD-10-CM

## 2025-09-02 DIAGNOSIS — M19.041 LOCALIZED OSTEOARTHRITIS OF BOTH HANDS: ICD-10-CM

## 2025-09-02 DIAGNOSIS — R70.0 ELEVATED ERYTHROCYTE SEDIMENTATION RATE: ICD-10-CM

## 2025-09-02 DIAGNOSIS — M25.50 POLYARTHRALGIA: ICD-10-CM

## 2025-09-02 DIAGNOSIS — M19.042 LOCALIZED OSTEOARTHRITIS OF BOTH HANDS: ICD-10-CM

## 2025-09-02 DIAGNOSIS — G89.29 CHRONIC MIDLINE LOW BACK PAIN WITHOUT SCIATICA: ICD-10-CM

## 2025-09-02 DIAGNOSIS — M25.50 POLYARTHRALGIA: Primary | ICD-10-CM

## 2025-09-02 LAB
ALBUMIN SERPL BCG-MCNC: 4.5 G/DL (ref 3.4–4.9)
ALP SERPL-CCNC: 97 U/L (ref 38–126)
ALT SERPL W/O P-5'-P-CCNC: 17 U/L (ref 10–35)
ANION GAP SERPL CALC-SCNC: 11 MEQ/L (ref 8–16)
AST SERPL-CCNC: 26 U/L (ref 10–35)
BASOPHILS ABSOLUTE: 0 THOU/MM3 (ref 0–0.1)
BASOPHILS NFR BLD AUTO: 0.6 %
BILIRUB SERPL-MCNC: 0.6 MG/DL (ref 0.3–1.2)
BUN SERPL-MCNC: 20 MG/DL (ref 8–23)
CALCIUM SERPL-MCNC: 11.1 MG/DL (ref 8.5–10.5)
CHLORIDE SERPL-SCNC: 105 MEQ/L (ref 98–111)
CO2 SERPL-SCNC: 25 MEQ/L (ref 22–29)
CREAT SERPL-MCNC: 1.3 MG/DL (ref 0.5–0.9)
CRP SERPL-MCNC: < 0.3 MG/DL (ref 0–0.5)
DEPRECATED RDW RBC AUTO: 44.7 FL (ref 35–45)
EOSINOPHIL NFR BLD AUTO: 2 %
EOSINOPHILS ABSOLUTE: 0.2 THOU/MM3 (ref 0–0.4)
ERYTHROCYTE [DISTWIDTH] IN BLOOD BY AUTOMATED COUNT: 13.3 % (ref 11.5–14.5)
ERYTHROCYTE [SEDIMENTATION RATE] IN BLOOD BY WESTERGREN METHOD: 25 MM/HR (ref 0–20)
GFR SERPL CREATININE-BSD FRML MDRD: 42 ML/MIN/1.73M2
GLUCOSE SERPL-MCNC: 95 MG/DL (ref 74–109)
HCT VFR BLD AUTO: 37.6 % (ref 37–47)
HGB BLD-MCNC: 12.1 GM/DL (ref 12–16)
IMM GRANULOCYTES # BLD AUTO: 0.02 THOU/MM3 (ref 0–0.07)
IMM GRANULOCYTES NFR BLD AUTO: 0.3 %
IRON SATN MFR SERPL: 38 % (ref 20–50)
IRON SERPL-MCNC: 135 UG/DL (ref 37–145)
LYMPHOCYTES ABSOLUTE: 1.7 THOU/MM3 (ref 1–4.8)
LYMPHOCYTES NFR BLD AUTO: 21.2 %
MAGNESIUM SERPL-MCNC: 2 MG/DL (ref 1.6–2.6)
MCH RBC QN AUTO: 29.8 PG (ref 26–33)
MCHC RBC AUTO-ENTMCNC: 32.2 GM/DL (ref 32.2–35.5)
MCV RBC AUTO: 92.6 FL (ref 81–99)
MONOCYTES ABSOLUTE: 0.7 THOU/MM3 (ref 0.4–1.3)
MONOCYTES NFR BLD AUTO: 9.1 %
NEUTROPHILS ABSOLUTE: 5.3 THOU/MM3 (ref 1.8–7.7)
NEUTROPHILS NFR BLD AUTO: 66.8 %
NRBC BLD AUTO-RTO: 0 /100 WBC
PHOSPHATE SERPL-MCNC: 3.9 MG/DL (ref 2.5–4.5)
PLATELET # BLD AUTO: 252 THOU/MM3 (ref 130–400)
PMV BLD AUTO: 10.8 FL (ref 9.4–12.4)
POTASSIUM SERPL-SCNC: 4.3 MEQ/L (ref 3.5–5.2)
PROT SERPL-MCNC: 7.4 G/DL (ref 6.4–8.3)
PTH-INTACT SERPL-MCNC: 42 PG/ML (ref 15–65)
RBC # BLD AUTO: 4.06 MILL/MM3 (ref 4.2–5.4)
SODIUM SERPL-SCNC: 141 MEQ/L (ref 135–145)
TIBC SERPL-MCNC: 352 UG/DL (ref 171–450)
WBC # BLD AUTO: 8 THOU/MM3 (ref 4.8–10.8)

## 2025-09-02 PROCEDURE — 85025 COMPLETE CBC W/AUTO DIFF WBC: CPT

## 2025-09-02 PROCEDURE — 73130 X-RAY EXAM OF HAND: CPT

## 2025-09-02 PROCEDURE — 83550 IRON BINDING TEST: CPT

## 2025-09-02 PROCEDURE — 83735 ASSAY OF MAGNESIUM: CPT

## 2025-09-02 PROCEDURE — 86235 NUCLEAR ANTIGEN ANTIBODY: CPT

## 2025-09-02 PROCEDURE — 36415 COLL VENOUS BLD VENIPUNCTURE: CPT

## 2025-09-02 PROCEDURE — 1125F AMNT PAIN NOTED PAIN PRSNT: CPT | Performed by: INTERNAL MEDICINE

## 2025-09-02 PROCEDURE — 1123F ACP DISCUSS/DSCN MKR DOCD: CPT | Performed by: INTERNAL MEDICINE

## 2025-09-02 PROCEDURE — 80053 COMPREHEN METABOLIC PANEL: CPT

## 2025-09-02 PROCEDURE — 83540 ASSAY OF IRON: CPT

## 2025-09-02 PROCEDURE — 99204 OFFICE O/P NEW MOD 45 MIN: CPT | Performed by: INTERNAL MEDICINE

## 2025-09-02 PROCEDURE — 72202 X-RAY EXAM SI JOINTS 3/> VWS: CPT

## 2025-09-02 PROCEDURE — 86140 C-REACTIVE PROTEIN: CPT

## 2025-09-02 PROCEDURE — 85651 RBC SED RATE NONAUTOMATED: CPT

## 2025-09-02 PROCEDURE — 84100 ASSAY OF PHOSPHORUS: CPT

## 2025-09-02 PROCEDURE — 83970 ASSAY OF PARATHORMONE: CPT

## 2025-09-02 PROCEDURE — 72100 X-RAY EXAM L-S SPINE 2/3 VWS: CPT

## 2025-09-02 RX ORDER — ALLOPURINOL 100 MG/1
200 TABLET ORAL DAILY
COMMUNITY
Start: 2025-08-19

## 2025-09-02 RX ORDER — CETIRIZINE HYDROCHLORIDE 10 MG/1
10 TABLET ORAL DAILY
COMMUNITY

## 2025-09-02 RX ORDER — ALENDRONATE SODIUM 70 MG/1
TABLET ORAL
COMMUNITY

## 2025-09-02 ASSESSMENT — ENCOUNTER SYMPTOMS
EYES NEGATIVE: 1
GASTROINTESTINAL NEGATIVE: 1
RESPIRATORY NEGATIVE: 1

## 2025-09-04 LAB
ENA SS-A 60KD AB SER-ACNC: NORMAL
ENA SS-A IGG SER QL: NORMAL
ENA SS-B IGG SER IA-ACNC: 0 AU/ML (ref 0–40)